# Patient Record
Sex: MALE | Race: WHITE | Employment: FULL TIME | URBAN - METROPOLITAN AREA
[De-identification: names, ages, dates, MRNs, and addresses within clinical notes are randomized per-mention and may not be internally consistent; named-entity substitution may affect disease eponyms.]

---

## 2017-01-23 RX ORDER — ALLOPURINOL 300 MG/1
300 TABLET ORAL DAILY
Qty: 30 TABLET | Refills: 0 | Status: SHIPPED | OUTPATIENT
Start: 2017-01-23 | End: 2017-03-15 | Stop reason: SDUPTHER

## 2017-03-15 ENCOUNTER — OFFICE VISIT (OUTPATIENT)
Dept: FAMILY MEDICINE CLINIC | Age: 47
End: 2017-03-15
Payer: COMMERCIAL

## 2017-03-15 VITALS
SYSTOLIC BLOOD PRESSURE: 114 MMHG | BODY MASS INDEX: 36.78 KG/M2 | HEART RATE: 60 BPM | WEIGHT: 295.8 LBS | DIASTOLIC BLOOD PRESSURE: 82 MMHG | HEIGHT: 75 IN

## 2017-03-15 DIAGNOSIS — M1A.09X0 CHRONIC GOUT OF MULTIPLE SITES, UNSPECIFIED CAUSE: Chronic | ICD-10-CM

## 2017-03-15 DIAGNOSIS — M79.645 FINGER PAIN, LEFT: Primary | ICD-10-CM

## 2017-03-15 DIAGNOSIS — M79.645 FINGER PAIN, LEFT: ICD-10-CM

## 2017-03-15 DIAGNOSIS — I48.0 PAROXYSMAL ATRIAL FIBRILLATION (HCC): Chronic | ICD-10-CM

## 2017-03-15 PROCEDURE — 90471 IMMUNIZATION ADMIN: CPT

## 2017-03-15 PROCEDURE — 90715 TDAP VACCINE 7 YRS/> IM: CPT

## 2017-03-15 PROCEDURE — 99213 OFFICE O/P EST LOW 20 MIN: CPT

## 2017-03-15 RX ORDER — PROPAFENONE HYDROCHLORIDE 225 MG/1
225 CAPSULE, EXTENDED RELEASE ORAL 2 TIMES DAILY
COMMUNITY
Start: 2017-01-19 | End: 2017-09-07 | Stop reason: SDUPTHER

## 2017-03-15 RX ORDER — ALLOPURINOL 300 MG/1
300 TABLET ORAL DAILY
Qty: 90 TABLET | Refills: 3 | Status: SHIPPED | OUTPATIENT
Start: 2017-03-15 | End: 2018-07-23 | Stop reason: SDUPTHER

## 2017-03-15 ASSESSMENT — ENCOUNTER SYMPTOMS
SHORTNESS OF BREATH: 0
COUGH: 0
GASTROINTESTINAL NEGATIVE: 1

## 2017-03-17 ENCOUNTER — HOSPITAL ENCOUNTER (OUTPATIENT)
Age: 47
Setting detail: SPECIMEN
Discharge: HOME | End: 2017-03-17
Payer: COMMERCIAL

## 2017-03-17 DIAGNOSIS — I48.0 PAROXYSMAL ATRIAL FIBRILLATION (HCC): Chronic | ICD-10-CM

## 2017-03-17 LAB
ALBUMIN SERPL-MCNC: 4.5 G/DL (ref 3.5–5.2)
ALBUMIN/GLOBULIN RATIO: 1.6 (ref 1–2.5)
ALP BLD-CCNC: 81 U/L (ref 40–129)
ALT SERPL-CCNC: 44 U/L (ref 5–41)
ANION GAP SERPL CALCULATED.3IONS-SCNC: 13 MMOL/L (ref 9–17)
AST SERPL-CCNC: 34 U/L
BILIRUB SERPL-MCNC: 2.1 MG/DL (ref 0.3–1.2)
BUN BLDV-MCNC: 25 MG/DL (ref 6–20)
BUN/CREAT BLD: ABNORMAL (ref 9–20)
CALCIUM SERPL-MCNC: 9.2 MG/DL (ref 8.6–10.4)
CHLORIDE BLD-SCNC: 102 MMOL/L (ref 98–107)
CHOLESTEROL/HDL RATIO: 5.3
CHOLESTEROL: 144 MG/DL
CO2: 26 MMOL/L (ref 20–31)
CREAT SERPL-MCNC: 1.06 MG/DL (ref 0.7–1.2)
GFR AFRICAN AMERICAN: >60 ML/MIN
GFR NON-AFRICAN AMERICAN: >60 ML/MIN
GFR SERPL CREATININE-BSD FRML MDRD: ABNORMAL ML/MIN/{1.73_M2}
GFR SERPL CREATININE-BSD FRML MDRD: ABNORMAL ML/MIN/{1.73_M2}
GLUCOSE BLD-MCNC: 89 MG/DL (ref 70–99)
HDLC SERPL-MCNC: 27 MG/DL
HIV AG/AB: NONREACTIVE
LDL CHOLESTEROL: 95 MG/DL (ref 0–130)
POTASSIUM SERPL-SCNC: 4.6 MMOL/L (ref 3.7–5.3)
SODIUM BLD-SCNC: 141 MMOL/L (ref 135–144)
TOTAL PROTEIN: 7.4 G/DL (ref 6.4–8.3)
TRIGL SERPL-MCNC: 109 MG/DL
URIC ACID: 8.6 MG/DL (ref 3.4–7)
VLDLC SERPL CALC-MCNC: ABNORMAL MG/DL (ref 1–30)

## 2017-09-07 ENCOUNTER — OFFICE VISIT (OUTPATIENT)
Dept: FAMILY MEDICINE CLINIC | Age: 47
End: 2017-09-07
Payer: COMMERCIAL

## 2017-09-07 ENCOUNTER — HOSPITAL ENCOUNTER (OUTPATIENT)
Age: 47
Setting detail: SPECIMEN
Discharge: HOME | End: 2017-09-07
Payer: COMMERCIAL

## 2017-09-07 VITALS
OXYGEN SATURATION: 98 % | SYSTOLIC BLOOD PRESSURE: 136 MMHG | HEART RATE: 68 BPM | BODY MASS INDEX: 32.55 KG/M2 | WEIGHT: 261.8 LBS | DIASTOLIC BLOOD PRESSURE: 84 MMHG | HEIGHT: 75 IN

## 2017-09-07 DIAGNOSIS — I48.0 PAROXYSMAL ATRIAL FIBRILLATION (HCC): ICD-10-CM

## 2017-09-07 DIAGNOSIS — M1A.09X0 CHRONIC GOUT OF MULTIPLE SITES, UNSPECIFIED CAUSE: ICD-10-CM

## 2017-09-07 DIAGNOSIS — I48.0 PAROXYSMAL ATRIAL FIBRILLATION (HCC): Primary | ICD-10-CM

## 2017-09-07 LAB
ANION GAP SERPL CALCULATED.3IONS-SCNC: 12 MMOL/L (ref 9–17)
BUN BLDV-MCNC: 16 MG/DL (ref 6–20)
BUN/CREAT BLD: NORMAL (ref 9–20)
CALCIUM SERPL-MCNC: 9.6 MG/DL (ref 8.6–10.4)
CHLORIDE BLD-SCNC: 103 MMOL/L (ref 98–107)
CO2: 25 MMOL/L (ref 20–31)
CREAT SERPL-MCNC: 0.94 MG/DL (ref 0.7–1.2)
GFR AFRICAN AMERICAN: >60 ML/MIN
GFR NON-AFRICAN AMERICAN: >60 ML/MIN
GFR SERPL CREATININE-BSD FRML MDRD: NORMAL ML/MIN/{1.73_M2}
GFR SERPL CREATININE-BSD FRML MDRD: NORMAL ML/MIN/{1.73_M2}
GLUCOSE BLD-MCNC: 94 MG/DL (ref 70–99)
POTASSIUM SERPL-SCNC: 5.1 MMOL/L (ref 3.7–5.3)
SODIUM BLD-SCNC: 140 MMOL/L (ref 135–144)
URIC ACID: 7 MG/DL (ref 3.4–7)

## 2017-09-07 PROCEDURE — 99214 OFFICE O/P EST MOD 30 MIN: CPT | Performed by: FAMILY MEDICINE

## 2017-09-07 RX ORDER — PROPAFENONE HYDROCHLORIDE 225 MG/1
225 CAPSULE, EXTENDED RELEASE ORAL 2 TIMES DAILY
Qty: 60 CAPSULE | Refills: 0 | Status: SHIPPED | OUTPATIENT
Start: 2017-09-07 | End: 2017-11-03 | Stop reason: ALTCHOICE

## 2017-09-07 ASSESSMENT — PATIENT HEALTH QUESTIONNAIRE - PHQ9
2. FEELING DOWN, DEPRESSED OR HOPELESS: 0
SUM OF ALL RESPONSES TO PHQ QUESTIONS 1-9: 0
1. LITTLE INTEREST OR PLEASURE IN DOING THINGS: 0
SUM OF ALL RESPONSES TO PHQ9 QUESTIONS 1 & 2: 0

## 2017-11-03 ENCOUNTER — OFFICE VISIT (OUTPATIENT)
Dept: FAMILY MEDICINE CLINIC | Age: 47
End: 2017-11-03
Payer: COMMERCIAL

## 2017-11-03 VITALS
HEART RATE: 80 BPM | SYSTOLIC BLOOD PRESSURE: 116 MMHG | OXYGEN SATURATION: 98 % | WEIGHT: 261 LBS | BODY MASS INDEX: 32.45 KG/M2 | DIASTOLIC BLOOD PRESSURE: 64 MMHG | HEIGHT: 75 IN

## 2017-11-03 DIAGNOSIS — M25.511 CHRONIC RIGHT SHOULDER PAIN: Primary | ICD-10-CM

## 2017-11-03 DIAGNOSIS — G89.29 CHRONIC RIGHT SHOULDER PAIN: Primary | ICD-10-CM

## 2017-11-03 PROCEDURE — 99213 OFFICE O/P EST LOW 20 MIN: CPT | Performed by: FAMILY MEDICINE

## 2017-11-03 NOTE — PROGRESS NOTES
Subjective:  Carmel Torrez presents for   Chief Complaint   Patient presents with    Shoulder Pain     right     For months. Is weight lifting and doing calisthenics. is off all the cardiac meds as of last months      Patient Active Problem List   Diagnosis    Chronic gout of multiple sites    Paroxysmal atrial fibrillation ([facility] [address]       Review of Systems:  · General: no significant weight changes. · Respiratory: no cough, pleuritic chest pain, dyspnea, or wheezing  · Cardiovascular: no pain, GONZALEZ, orthopnea, palpitations, or claudication  · Gastrointestinal: no chronic nausea, vomiting, heartburn, diarrhea, constipation, bloating, or abdominal pain. No bloody or black stools. Objective:  Physical Exam   Vitals:   Vitals:    11/03/17 0750   BP: 116/64   Pulse: 80   SpO2: 98%   Weight: 261 lb (118.4 kg)   Height: 6' 3\" (1.905 m)     Wt Readings from Last 3 Encounters:   11/03/17 261 lb (118.4 kg)   09/07/17 261 lb 12.8 oz (118.8 kg)   03/15/17 295 lb 12.8 oz (134.2 kg)     Ht Readings from Last 3 Encounters:   11/03/17 6' 3\" (1.905 m)   09/07/17 6' 3\" (1.905 m)   03/15/17 6' 3\" (1.905 m)     Body mass index is 32.62 kg/m². Constitutional: He is oriented to person, place, and time. He appears well-developed and well-nourished and in no acute distress. Answers all my questions appropriately. Head: Normocephalic and atraumatic. Eyes:conjunctiva appear normal.      From of the right shoulder. But complains of discomfort over the mid deltoid. Normal strenght. No discoloration. Assessment:   Encounter Diagnosis   Name Primary?  Chronic right shoulder pain Yes         Plan:   eliminate the weight training and calesthenic and any exercise inviling the ue. X 4 weeks. Call if not resolved.     Use heat and topicals creams and nsaids

## 2018-07-23 RX ORDER — ALLOPURINOL 300 MG/1
300 TABLET ORAL DAILY
Qty: 90 TABLET | Refills: 3 | Status: SHIPPED | OUTPATIENT
Start: 2018-07-23 | End: 2019-10-30 | Stop reason: SDUPTHER

## 2018-07-23 NOTE — TELEPHONE ENCOUNTER
Next Visit Date:  No future appointments.     Health Maintenance   Topic Date Due    Flu vaccine (1) 09/01/2018    Lipid screen  03/17/2022    DTaP/Tdap/Td vaccine (2 - Td) 03/15/2027    HIV screen  Completed       No results found for: LABA1C          ( goal A1C is < 7)   No results found for: LABMICR  LDL Cholesterol (mg/dL)   Date Value   03/17/2017 95   10/16/2015 89       (goal LDL is <100)   AST (U/L)   Date Value   03/17/2017 34     ALT (U/L)   Date Value   03/17/2017 44 (H)     BUN (mg/dL)   Date Value   09/07/2017 16     BP Readings from Last 3 Encounters:   11/03/17 116/64   09/07/17 136/84   03/15/17 114/82          (goal 120/80)    All Future Testing planned in [facility]              Patient Active Problem List:     Chronic gout of multiple sites     Paroxysmal atrial fibrillation ([facility] [address]

## 2018-11-27 ENCOUNTER — PATIENT MESSAGE (OUTPATIENT)
Dept: FAMILY MEDICINE CLINIC | Age: 48
End: 2018-11-27

## 2018-11-27 DIAGNOSIS — Z80.3 FH: BREAST CANCER IN FIRST DEGREE RELATIVE: Primary | ICD-10-CM

## 2018-12-10 ENCOUNTER — INITIAL CONSULT (OUTPATIENT)
Dept: ONCOLOGY | Age: 48
End: 2018-12-10
Payer: COMMERCIAL

## 2018-12-10 DIAGNOSIS — Z80.3 FAMILY HISTORY OF BREAST CANCER: Primary | ICD-10-CM

## 2018-12-10 DIAGNOSIS — Z84.81 FAMILY HISTORY OF BRCA2 GENE POSITIVE: ICD-10-CM

## 2018-12-10 PROCEDURE — 96040 PR GENETIC COUNSELING, EACH 30 MIN: CPT | Performed by: GENETIC COUNSELOR, MS

## 2018-12-10 NOTE — PROGRESS NOTES
3 [facility]   Hereditary Cancer Risk Assessment      Name: Jaime Chavez     YOB: 1970   Date of Consultation: 12/10/18      Mr. Joshua Zavala was seen at the [facility] for genetic counseling on 12/10/18. Mr. Joshua Zavala was referred by Dr. Becka Jauregui MD due to his family history of breast cancer due to a known BRCA2 gene mutation.     PERSONAL AND FAMILY HISTORY   Mr. Joshua Zavala is a 52 y.o.  male with no personal history of cancer. He has no major cancer risk factors aside from his occupation which is a . His family history is significant for his mother with breast cancer who recently underwent genetic testing and was found to carry a BRCA2 gene mutation. Mr. Joshua Zavala has several other maternal relatives with breast and ovarian cancer which the familial BRCA2 mutation has been linked to. Mr. Melyssa Parsons paternal family is significant for his father with prostate cancer diagnosed at age 59 and a paternal uncle with colon cancer at age 62. There are no other significant cancers on that side of the family. RISK ASSESSMENT   We discussed that approximately 5-10% of cancers are due to a hereditary gene mutation which causes an increased risk for certain cancers. His mother was offered genetic counseling and testing based on her personal and family history of breast cancer. His mother's test results revealed a mutation in the BRCA2 gene. The specific mutation is called [id number] ([id number]*). First degree relatives (parents, siblings, and children) of an individual with a BRCA2 mutation have a 1 in 2 (50%) chance of being a carrier for the same genetic mutation. Thus, Mr. Joshua Zavala has a 50% chance to be a carrier for the mutation.      In summary, Mr. Joshua Zavala meets the [address] [place] [address] (NCCN) guidelines for genetic testing to clarify whether he carries the familial BRCA2 mutation.      Since there are few

## 2019-01-03 ENCOUNTER — TELEPHONE (OUTPATIENT)
Dept: ONCOLOGY | Age: 49
End: 2019-01-03

## 2019-01-30 ENCOUNTER — HOSPITAL ENCOUNTER (OUTPATIENT)
Age: 49
Setting detail: SPECIMEN
Discharge: HOME | End: 2019-01-30
Payer: COMMERCIAL

## 2019-01-30 ENCOUNTER — OFFICE VISIT (OUTPATIENT)
Dept: FAMILY MEDICINE CLINIC | Age: 49
End: 2019-01-30
Payer: COMMERCIAL

## 2019-01-30 VITALS
HEART RATE: 69 BPM | WEIGHT: 254.38 LBS | OXYGEN SATURATION: 98 % | DIASTOLIC BLOOD PRESSURE: 70 MMHG | BODY MASS INDEX: 31.79 KG/M2 | SYSTOLIC BLOOD PRESSURE: 130 MMHG

## 2019-01-30 DIAGNOSIS — Z15.09 BRCA GENE MUTATION POSITIVE IN MALE: ICD-10-CM

## 2019-01-30 DIAGNOSIS — Z15.01 BRCA GENE MUTATION POSITIVE IN MALE: ICD-10-CM

## 2019-01-30 DIAGNOSIS — I48.0 PAROXYSMAL ATRIAL FIBRILLATION (HCC): ICD-10-CM

## 2019-01-30 DIAGNOSIS — Z15.03 BRCA GENE MUTATION POSITIVE IN MALE: ICD-10-CM

## 2019-01-30 DIAGNOSIS — Z12.5 SCREENING FOR PROSTATE CANCER: ICD-10-CM

## 2019-01-30 DIAGNOSIS — M1A.09X0 CHRONIC GOUT OF MULTIPLE SITES, UNSPECIFIED CAUSE: Primary | ICD-10-CM

## 2019-01-30 DIAGNOSIS — M1A.09X0 CHRONIC GOUT OF MULTIPLE SITES, UNSPECIFIED CAUSE: ICD-10-CM

## 2019-01-30 LAB
PROSTATE SPECIFIC ANTIGEN: 1.31 UG/L
URIC ACID: 7.6 MG/DL (ref 3.4–7)

## 2019-01-30 PROCEDURE — 99214 OFFICE O/P EST MOD 30 MIN: CPT | Performed by: FAMILY MEDICINE

## 2019-01-30 ASSESSMENT — PATIENT HEALTH QUESTIONNAIRE - PHQ9
SUM OF ALL RESPONSES TO PHQ9 QUESTIONS 1 & 2: 0
SUM OF ALL RESPONSES TO PHQ QUESTIONS 1-9: 0
2. FEELING DOWN, DEPRESSED OR HOPELESS: 0
SUM OF ALL RESPONSES TO PHQ QUESTIONS 1-9: 0
1. LITTLE INTEREST OR PLEASURE IN DOING THINGS: 0

## 2019-01-31 DIAGNOSIS — M1A.09X0 CHRONIC GOUT OF MULTIPLE SITES, UNSPECIFIED CAUSE: Primary | Chronic | ICD-10-CM

## 2019-03-29 ENCOUNTER — HOSPITAL ENCOUNTER (OUTPATIENT)
Age: 49
Setting detail: SPECIMEN
Discharge: HOME | End: 2019-03-29
Payer: COMMERCIAL

## 2019-03-29 LAB — URIC ACID: 5.9 MG/DL (ref 3.4–7)

## 2019-10-31 RX ORDER — ALLOPURINOL 300 MG/1
TABLET ORAL
Qty: 90 TABLET | Refills: 2 | Status: SHIPPED | OUTPATIENT
Start: 2019-10-31 | End: 2020-11-19

## 2019-12-05 ENCOUNTER — OFFICE VISIT (OUTPATIENT)
Dept: FAMILY MEDICINE CLINIC | Age: 49
End: 2019-12-05
Payer: COMMERCIAL

## 2019-12-05 VITALS
RESPIRATION RATE: 18 BRPM | OXYGEN SATURATION: 98 % | DIASTOLIC BLOOD PRESSURE: 90 MMHG | BODY MASS INDEX: 35.88 KG/M2 | HEIGHT: 75 IN | HEART RATE: 84 BPM | SYSTOLIC BLOOD PRESSURE: 124 MMHG | WEIGHT: 288.6 LBS

## 2019-12-05 DIAGNOSIS — J40 BRONCHITIS: Primary | ICD-10-CM

## 2019-12-05 PROCEDURE — 99213 OFFICE O/P EST LOW 20 MIN: CPT | Performed by: FAMILY MEDICINE

## 2019-12-05 RX ORDER — DOXYCYCLINE 100 MG/1
100 CAPSULE ORAL 2 TIMES DAILY WITH MEALS
Qty: 20 CAPSULE | Refills: 0 | Status: SHIPPED | OUTPATIENT
Start: 2019-12-05 | End: 2020-08-24

## 2020-04-24 ENCOUNTER — E-VISIT (OUTPATIENT)
Dept: FAMILY MEDICINE CLINIC | Age: 50
End: 2020-04-24
Payer: COMMERCIAL

## 2020-04-24 PROCEDURE — 99421 OL DIG E/M SVC 5-10 MIN: CPT | Performed by: FAMILY MEDICINE

## 2020-04-24 RX ORDER — CYCLOBENZAPRINE HCL 10 MG
10 TABLET ORAL 3 TIMES DAILY PRN
Qty: 30 TABLET | Refills: 0 | Status: SHIPPED | OUTPATIENT
Start: 2020-04-24 | End: 2020-05-04

## 2020-04-24 NOTE — PROGRESS NOTES
HPI: per patient's questionnaire    EXAM: not applicable    Diagnoses and all orders for this visit:    1. Acute exacerbation of chronic low back pain  Failing to change as expected. - cyclobenzaprine (FLEXERIL) 10 MG tablet; Take 1 tablet by mouth 3 times daily as needed for Muscle spasms Causes sedation, don't drive when taking it  Dispense: 30 tablet; Refill: 0  - camphor-menthol-methyl salicylate (BENGAY ULTRA STRENGTH) 4-10-30 % CREA cream; Apply topically 3 times daily as needed for Pain  Dispense: 113 g; Refill: 0  - XR LUMBAR SPINE (2-3 VIEWS); Future      Patient was advised to contact PCP if symptoms worsen or failing to change as expected    5-10 minutes were spent on the digital evaluation and management of this patient.

## 2020-04-27 ENCOUNTER — NURSE TRIAGE (OUTPATIENT)
Dept: OTHER | Facility: CLINIC | Age: 50
End: 2020-04-27

## 2020-04-27 NOTE — TELEPHONE ENCOUNTER
some weakness when pain shoots at times with positioning. States he was advised to call back in 2 days if not better so pt. Transferred to [facility] to schedule with another e visit or virtual visit today. Please do not respond to the triage nurse through this encounter. Any subsequent communication should be directly with the patient.

## 2020-04-28 ENCOUNTER — TELEMEDICINE (OUTPATIENT)
Dept: FAMILY MEDICINE CLINIC | Age: 50
End: 2020-04-28
Payer: COMMERCIAL

## 2020-04-28 ENCOUNTER — TELEPHONE (OUTPATIENT)
Dept: FAMILY MEDICINE CLINIC | Age: 50
End: 2020-04-28

## 2020-04-28 PROCEDURE — 99214 OFFICE O/P EST MOD 30 MIN: CPT | Performed by: FAMILY MEDICINE

## 2020-04-28 RX ORDER — PREDNISONE 20 MG/1
60 TABLET ORAL ONCE
Qty: 3 TABLET | Refills: 0 | Status: SHIPPED | OUTPATIENT
Start: 2020-04-28 | End: 2020-04-28

## 2020-04-28 RX ORDER — PREDNISONE 20 MG/1
20 TABLET ORAL 2 TIMES DAILY
Qty: 10 TABLET | Refills: 0 | Status: SHIPPED | OUTPATIENT
Start: 2020-04-29 | End: 2020-05-04

## 2020-04-28 RX ORDER — NAPROXEN 500 MG/1
500 TABLET ORAL 2 TIMES DAILY WITH MEALS
Qty: 60 TABLET | Refills: 3 | Status: SHIPPED | OUTPATIENT
Start: 2020-04-28 | End: 2020-08-24

## 2020-04-28 RX ORDER — CYCLOBENZAPRINE HCL 10 MG
10 TABLET ORAL 3 TIMES DAILY PRN
Qty: 30 TABLET | Refills: 0 | Status: SHIPPED | OUTPATIENT
Start: 2020-04-28 | End: 2020-05-08

## 2020-04-28 NOTE — PROGRESS NOTES
Progress Note    Madhav Silvestre is a 52 y.o.  male who presents today alone for evaluation of   Chief Complaint   Patient presents with    Lower Back Pain           HPI:   Patient is here for low back pain. Patient states this has been ongoing for the last year. Patient states he has had a dull ache over the lumbar/sacral area. Patient states it can radiate into either lower extremity. Patient denies trauma or injury to the low back. Patient did go to  and was given muscle relaxers and steroids. Patient denies saddle anesthesia, urinary retention, or stool incontinence. He denies radicular symptoms. He has not had any recent imaging of his low back. He has not completed PT for his low back. He has never seen a specialist for low back pain. Patient does go to the chiropractor which does provide him with some relief. There are no preventive care reminders to display for this patient.      Current Medications:     Current Outpatient Medications   Medication Sig Dispense Refill    [START ON 4/29/2020] predniSONE (DELTASONE) 20 MG tablet Take 1 tablet by mouth 2 times daily for 5 days Start tomorrow 10 tablet 0    predniSONE (DELTASONE) 20 MG tablet Take 3 tablets by mouth once for 1 dose Start today 3 tablet 0    cyclobenzaprine (FLEXERIL) 10 MG tablet Take 1 tablet by mouth 3 times daily as needed for Muscle spasms 30 tablet 0    naproxen (NAPROSYN) 500 MG tablet Take 1 tablet by mouth 2 times daily (with meals) 60 tablet 3    cyclobenzaprine (FLEXERIL) 10 MG tablet Take 1 tablet by mouth 3 times daily as needed for Muscle spasms Causes sedation, don't drive when taking it 30 tablet 0    camphor-menthol-methyl salicylate (BENGAY ULTRA STRENGTH) 4-10-30 % CREA cream Apply topically 3 times daily as needed for Pain 113 g 0    doxycycline monohydrate (MONODOX) 100 MG capsule Take 1 capsule by mouth 2 times daily (with meals) Avoid calcium, mtv's and dairy 2 hours before and after 20 capsule 0   

## 2020-08-24 ENCOUNTER — OFFICE VISIT (OUTPATIENT)
Dept: FAMILY MEDICINE CLINIC | Age: 50
End: 2020-08-24
Payer: COMMERCIAL

## 2020-08-24 VITALS
HEART RATE: 87 BPM | WEIGHT: 272 LBS | BODY MASS INDEX: 34 KG/M2 | SYSTOLIC BLOOD PRESSURE: 120 MMHG | DIASTOLIC BLOOD PRESSURE: 80 MMHG | TEMPERATURE: 98.2 F | OXYGEN SATURATION: 98 % | RESPIRATION RATE: 18 BRPM

## 2020-08-24 PROCEDURE — 99214 OFFICE O/P EST MOD 30 MIN: CPT | Performed by: FAMILY MEDICINE

## 2020-08-24 RX ORDER — OMEPRAZOLE 20 MG/1
20 CAPSULE, DELAYED RELEASE ORAL DAILY
Qty: 30 CAPSULE | Refills: 11 | Status: SHIPPED | OUTPATIENT
Start: 2020-08-24

## 2020-08-24 ASSESSMENT — PATIENT HEALTH QUESTIONNAIRE - PHQ9
SUM OF ALL RESPONSES TO PHQ QUESTIONS 1-9: 0
SUM OF ALL RESPONSES TO PHQ QUESTIONS 1-9: 0
2. FEELING DOWN, DEPRESSED OR HOPELESS: 0
SUM OF ALL RESPONSES TO PHQ9 QUESTIONS 1 & 2: 0
1. LITTLE INTEREST OR PLEASURE IN DOING THINGS: 0

## 2020-08-24 NOTE — PROGRESS NOTES
Subjective:  Tiffanie Nazario presents for   Chief Complaint   Patient presents with    Abdominal Pain    Shortness of Breath    Anorexia   got worse thei weekend . Started last week    Eating food can make it worse , especially volume of food. Antiacids help a littl    Belching helps. Sleeping really is not alltered. Feels bloated. No diarrhrea. No change in meds    Takes naproxedn daily    etoh - rare    everyonesce in a while the bloating makes it hard to take a deep breath (this is what he describes asw SOB)    In the past he wuold get terrible gerd if eating late at night      Patient Active Problem List   Diagnosis    Chronic gout of multiple sites    Paroxysmal atrial fibrillation ([facility] [address]    BRCA gene mutation positive in male         Review of Systems:  · General: no significant weight changes. · Respiratory: no cough, pleuritic chest pain, dyspnea, or wheezing  · Cardiovascular: no pain, GONZALEZ, orthopnea, palpitations or claudication  · Gastrointestinal: no chronic nausea, vomiting, heartburn, diarrhea, constipation, bloating, or abdominal pain. No bloody or black stools. Objective:  Physical Exam   Vitals:   Vitals:    08/24/20 0951   BP: 120/80   Pulse: 87   Resp: 18   Temp: 98.2 °F (36.8 °C)   TempSrc: Temporal   SpO2: 98%   Weight: 272 lb (123.4 kg)     Wt Readings from Last 3 Encounters:   08/24/20 272 lb (123.4 kg)   12/05/19 288 lb 9.6 oz (130.9 kg)   01/30/19 254 lb 6 oz (115.4 kg)     Ht Readings from Last 3 Encounters:   12/05/19 6' 3\" (1.905 m)   11/03/17 6' 3\" (1.905 m)   09/07/17 6' 3\" (1.905 m)     Body mass index is 34 kg/m². Constitutional: He is oriented to person, place, and time. He appears well-developed and well-nourished and in no acute distress. Answers all my questions appropriately. Head: Normocephalic and atraumatic. Eyes:conjunctiva appear normal.  Heart: RRR without murmur. No S3, S4, or gallop noted. Chest: Clear to auscultation bilaterally.   Good breath diet and eating habits, such as not eating late at night, losing weight, and making other lifestyle changes can also help reduce symptoms. Follow-up care is a key part of your treatment and safety. Be sure to make and go to all appointments, and call your doctor if you are having problems. It's also a good idea to know your test results and keep a list of the medicines you take. How can you care for yourself at home? · Take your medicines exactly as prescribed. Call your doctor if you think you are having a problem with your medicine. · Your doctor may recommend over-the-counter medicine. For mild or occasional indigestion, antacids, such as Tums, Gaviscon, Mylanta, or Maalox, may help. Your doctor also may recommend over-the-counter acid reducers, such as Pepcid AC (famotidine), Tagamet HB (cimetidine), or Prilosec (omeprazole). Read and follow all instructions on the label. If you use these medicines often, talk with your doctor. · Change your eating habits. ? It's best to eat several small meals instead of two or three large meals. ? After you eat, wait 2 to 3 hours before you lie down. ? Chocolate, mint, and alcohol can make GERD worse. ? Spicy foods, foods that have a lot of acid (like tomatoes and oranges), and coffee can make GERD symptoms worse in some people. If your symptoms are worse after you eat a certain food, you may want to stop eating that food to see if your symptoms get better. · Do not smoke or chew tobacco. Smoking can make GERD worse. If you need help quitting, talk to your doctor about stop-smoking programs and medicines. These can increase your chances of quitting for good. · If you have GERD symptoms at night, raise the head of your bed 6 to 8 inches by putting the frame on blocks or placing a foam wedge under the head of your mattress. (Adding extra pillows does not work.)  · Do not wear tight clothing around your middle. · Lose weight if you need to.  Losing just 5 to 10 pounds can help. When should you call for help? Call your doctor now or seek immediate medical care if:  · You have new or different belly pain. · Your stools are black and tarlike or have streaks of blood. Watch closely for changes in your health, and be sure to contact your doctor if:  · Your symptoms have not improved after 2 days. · Food seems to catch in your throat or chest.  Where can you learn more? Go to https://Planet PaymentpeOdyssey Mobile Interactioneb.UserApp. org and sign in to your [employer] account. Enter F739 in the [employer] box to learn more about \"Gastroesophageal Reflux Disease (GERD): Care Instructions. \"     If you do not have an account, please click on the \"Sign Up Now\" link. Current as of: August 12, 2019               Content Version: 12.5  © 0537-6035 Healthwise, Incorporated. Care instructions adapted under license by [facility] ([facility]). If you have questions about a medical condition or this instruction, always ask your healthcare professional. [address] any warranty or liability for your use of this information. Data Unavailable      Discussed harry, covid.     He is to contact me in 2 weeks about the GERD tx

## 2020-08-24 NOTE — PATIENT INSTRUCTIONS
Patient Education        Gastroesophageal Reflux Disease (GERD): Care Instructions  Your Care Instructions     Gastroesophageal reflux disease (GERD) is the backward flow of stomach acid into the esophagus. The esophagus is the tube that leads from your throat to your stomach. A one-way valve prevents the stomach acid from backing up into this tube. When you have GERD, this valve does not close tightly enough. This can also cause pain and swelling in your esophagus (esophagitis). If you have mild GERD symptoms including heartburn, you may be able to control the problem with antacids or over-the-counter medicine. Changing your diet and eating habits, such as not eating late at night, losing weight, and making other lifestyle changes can also help reduce symptoms. Follow-up care is a key part of your treatment and safety. Be sure to make and go to all appointments, and call your doctor if you are having problems. It's also a good idea to know your test results and keep a list of the medicines you take. How can you care for yourself at home? · Take your medicines exactly as prescribed. Call your doctor if you think you are having a problem with your medicine. · Your doctor may recommend over-the-counter medicine. For mild or occasional indigestion, antacids, such as Tums, Gaviscon, Mylanta, or Maalox, may help. Your doctor also may recommend over-the-counter acid reducers, such as Pepcid AC (famotidine), Tagamet HB (cimetidine), or Prilosec (omeprazole). Read and follow all instructions on the label. If you use these medicines often, talk with your doctor. · Change your eating habits. ? It's best to eat several small meals instead of two or three large meals. ? After you eat, wait 2 to 3 hours before you lie down. ? Chocolate, mint, and alcohol can make GERD worse. ? Spicy foods, foods that have a lot of acid (like tomatoes and oranges), and coffee can make GERD symptoms worse in some people.  If your symptoms are worse after you eat a certain food, you may want to stop eating that food to see if your symptoms get better. · Do not smoke or chew tobacco. Smoking can make GERD worse. If you need help quitting, talk to your doctor about stop-smoking programs and medicines. These can increase your chances of quitting for good. · If you have GERD symptoms at night, raise the head of your bed 6 to 8 inches by putting the frame on blocks or placing a foam wedge under the head of your mattress. (Adding extra pillows does not work.)  · Do not wear tight clothing around your middle. · Lose weight if you need to. Losing just 5 to 10 pounds can help. When should you call for help? Call your doctor now or seek immediate medical care if:  · You have new or different belly pain. · Your stools are black and tarlike or have streaks of blood. Watch closely for changes in your health, and be sure to contact your doctor if:  · Your symptoms have not improved after 2 days. · Food seems to catch in your throat or chest.  Where can you learn more? Go to https://Where.eTask.it. org and sign in to your [employer] account. Enter Z092 in the [facility] box to learn more about \"Gastroesophageal Reflux Disease (GERD): Care Instructions. \"     If you do not have an account, please click on the \"Sign Up Now\" link. Current as of: August 12, 2019               Content Version: 12.5  © 0024-8498 Healthwise, Incorporated. Care instructions adapted under license by [facility] ([facility]). If you have questions about a medical condition or this instruction, always ask your healthcare professional. [address] any warranty or liability for your use of this information.

## 2020-10-25 ENCOUNTER — HOSPITAL ENCOUNTER (OUTPATIENT)
Age: 50
Discharge: HOME | End: 2020-10-25
Payer: COMMERCIAL

## 2020-10-25 PROCEDURE — U0003 INFECTIOUS AGENT DETECTION BY NUCLEIC ACID (DNA OR RNA); SEVERE ACUTE RESPIRATORY SYNDROME CORONAVIRUS 2 (SARS-COV-2) (CORONAVIRUS DISEASE [COVID-19]), AMPLIFIED PROBE TECHNIQUE, MAKING USE OF HIGH THROUGHPUT TECHNOLOGIES AS DESCRIBED BY CMS-2020-01-R: HCPCS

## 2020-10-26 LAB
SARS-COV-2, RAPID: ABNORMAL
SARS-COV-2: ABNORMAL
SARS-COV-2: DETECTED
SOURCE: ABNORMAL

## 2020-10-28 ENCOUNTER — TELEPHONE (OUTPATIENT)
Dept: OPHTHALMOLOGY | Age: 50
End: 2020-10-28

## 2020-11-19 RX ORDER — ALLOPURINOL 300 MG/1
TABLET ORAL
Qty: 90 TABLET | Refills: 1 | Status: SHIPPED | OUTPATIENT
Start: 2020-11-19 | End: 2021-06-28

## 2020-11-19 NOTE — TELEPHONE ENCOUNTER
Kirill Alexander is calling to request a refill on the following medication(s):    Medication Request:  Requested Prescriptions     Pending Prescriptions Disp Refills    allopurinol (ZYLOPRIM) 300 MG tablet [Pharmacy Med Name: ALLOPURINOL 300 MG TABLET] 90 tablet 1     Sig: TAKE ONE TABLET BY MOUTH DAILY       Last Visit Date (If Applicable):  2/07/2110    Next Visit Date:    Visit date not found

## 2020-12-22 ENCOUNTER — HOSPITAL ENCOUNTER (OUTPATIENT)
Age: 50
Setting detail: SPECIMEN
Discharge: HOME | End: 2020-12-22
Payer: COMMERCIAL

## 2020-12-22 ENCOUNTER — OFFICE VISIT (OUTPATIENT)
Dept: FAMILY MEDICINE CLINIC | Age: 50
End: 2020-12-22
Payer: COMMERCIAL

## 2020-12-22 VITALS
OXYGEN SATURATION: 97 % | HEART RATE: 84 BPM | WEIGHT: 272.6 LBS | DIASTOLIC BLOOD PRESSURE: 82 MMHG | BODY MASS INDEX: 34.07 KG/M2 | SYSTOLIC BLOOD PRESSURE: 123 MMHG | TEMPERATURE: 97.9 F

## 2020-12-22 LAB
ABSOLUTE EOS #: 0.17 K/UL (ref 0–0.44)
ABSOLUTE IMMATURE GRANULOCYTE: <0.03 K/UL (ref 0–0.3)
ABSOLUTE LYMPH #: 1.48 K/UL (ref 1.1–3.7)
ABSOLUTE MONO #: 0.34 K/UL (ref 0.1–1.2)
ALBUMIN SERPL-MCNC: 4.6 G/DL (ref 3.5–5.2)
ALBUMIN/GLOBULIN RATIO: 1.6 (ref 1–2.5)
ALP BLD-CCNC: 60 U/L (ref 40–129)
ALT SERPL-CCNC: 29 U/L (ref 5–41)
ANION GAP SERPL CALCULATED.3IONS-SCNC: 17 MMOL/L (ref 9–17)
AST SERPL-CCNC: 28 U/L
BASOPHILS # BLD: 1 % (ref 0–2)
BASOPHILS ABSOLUTE: 0.04 K/UL (ref 0–0.2)
BILIRUB SERPL-MCNC: 1.9 MG/DL (ref 0.3–1.2)
BUN BLDV-MCNC: 15 MG/DL (ref 6–20)
BUN/CREAT BLD: ABNORMAL (ref 9–20)
CALCIUM SERPL-MCNC: 9.7 MG/DL (ref 8.6–10.4)
CHLORIDE BLD-SCNC: 105 MMOL/L (ref 98–107)
CHOLESTEROL/HDL RATIO: 4.2
CHOLESTEROL: 160 MG/DL
CO2: 19 MMOL/L (ref 20–31)
CREAT SERPL-MCNC: 0.99 MG/DL (ref 0.7–1.2)
DIFFERENTIAL TYPE: ABNORMAL
EOSINOPHILS RELATIVE PERCENT: 3 % (ref 1–4)
GFR AFRICAN AMERICAN: >60 ML/MIN
GFR NON-AFRICAN AMERICAN: >60 ML/MIN
GFR SERPL CREATININE-BSD FRML MDRD: ABNORMAL ML/MIN/{1.73_M2}
GFR SERPL CREATININE-BSD FRML MDRD: ABNORMAL ML/MIN/{1.73_M2}
GLUCOSE BLD-MCNC: 83 MG/DL (ref 70–99)
HCT VFR BLD CALC: 47.1 % (ref 40.7–50.3)
HDLC SERPL-MCNC: 38 MG/DL
HEMOGLOBIN: 15.9 G/DL (ref 13–17)
IMMATURE GRANULOCYTES: 0 %
LDL CHOLESTEROL: 97 MG/DL (ref 0–130)
LYMPHOCYTES # BLD: 24 % (ref 24–43)
MCH RBC QN AUTO: 27.7 PG (ref 25.2–33.5)
MCHC RBC AUTO-ENTMCNC: 33.8 G/DL (ref 28.4–34.8)
MCV RBC AUTO: 81.9 FL (ref 82.6–102.9)
MONOCYTES # BLD: 5 % (ref 3–12)
NRBC AUTOMATED: 0 PER 100 WBC
PDW BLD-RTO: 13.8 % (ref 11.8–14.4)
PLATELET # BLD: 170 K/UL (ref 138–453)
PLATELET ESTIMATE: ABNORMAL
PMV BLD AUTO: 10.8 FL (ref 8.1–13.5)
POTASSIUM SERPL-SCNC: 4.3 MMOL/L (ref 3.7–5.3)
PROSTATE SPECIFIC ANTIGEN: 1.48 UG/L
RBC # BLD: 5.75 M/UL (ref 4.21–5.77)
RBC # BLD: ABNORMAL 10*6/UL
SEG NEUTROPHILS: 67 % (ref 36–65)
SEGMENTED NEUTROPHILS ABSOLUTE COUNT: 4.21 K/UL (ref 1.5–8.1)
SODIUM BLD-SCNC: 141 MMOL/L (ref 135–144)
TOTAL PROTEIN: 7.5 G/DL (ref 6.4–8.3)
TRIGL SERPL-MCNC: 125 MG/DL
TSH SERPL DL<=0.05 MIU/L-ACNC: 0.86 MIU/L (ref 0.3–5)
URIC ACID: 6.3 MG/DL (ref 3.4–7)
VLDLC SERPL CALC-MCNC: ABNORMAL MG/DL (ref 1–30)
WBC # BLD: 6.3 K/UL (ref 3.5–11.3)
WBC # BLD: ABNORMAL 10*3/UL

## 2020-12-22 PROCEDURE — 90686 IIV4 VACC NO PRSV 0.5 ML IM: CPT | Performed by: FAMILY MEDICINE

## 2020-12-22 PROCEDURE — 90471 IMMUNIZATION ADMIN: CPT | Performed by: FAMILY MEDICINE

## 2020-12-22 PROCEDURE — 99214 OFFICE O/P EST MOD 30 MIN: CPT | Performed by: FAMILY MEDICINE

## 2020-12-22 RX ORDER — SENNOSIDES 8.6 MG
650 CAPSULE ORAL EVERY 8 HOURS PRN
COMMUNITY

## 2020-12-22 SDOH — ECONOMIC STABILITY: FOOD INSECURITY: WITHIN THE PAST 12 MONTHS, YOU WORRIED THAT YOUR FOOD WOULD RUN OUT BEFORE YOU GOT MONEY TO BUY MORE.: NEVER TRUE

## 2020-12-22 SDOH — ECONOMIC STABILITY: FOOD INSECURITY: WITHIN THE PAST 12 MONTHS, THE FOOD YOU BOUGHT JUST DIDN'T LAST AND YOU DIDN'T HAVE MONEY TO GET MORE.: NEVER TRUE

## 2020-12-22 SDOH — ECONOMIC STABILITY: INCOME INSECURITY: HOW HARD IS IT FOR YOU TO PAY FOR THE VERY BASICS LIKE FOOD, HOUSING, MEDICAL CARE, AND HEATING?: NOT HARD AT ALL

## 2020-12-22 SDOH — ECONOMIC STABILITY: TRANSPORTATION INSECURITY
IN THE PAST 12 MONTHS, HAS LACK OF TRANSPORTATION KEPT YOU FROM MEETINGS, WORK, OR FROM GETTING THINGS NEEDED FOR DAILY LIVING?: NOT ASKED

## 2020-12-22 SDOH — ECONOMIC STABILITY: TRANSPORTATION INSECURITY
IN THE PAST 12 MONTHS, HAS THE LACK OF TRANSPORTATION KEPT YOU FROM MEDICAL APPOINTMENTS OR FROM GETTING MEDICATIONS?: NOT ASKED

## 2020-12-22 ASSESSMENT — PATIENT HEALTH QUESTIONNAIRE - PHQ9
SUM OF ALL RESPONSES TO PHQ QUESTIONS 1-9: 1
SUM OF ALL RESPONSES TO PHQ9 QUESTIONS 1 & 2: 1
SUM OF ALL RESPONSES TO PHQ QUESTIONS 1-9: 1
2. FEELING DOWN, DEPRESSED OR HOPELESS: 1
SUM OF ALL RESPONSES TO PHQ QUESTIONS 1-9: 1
1. LITTLE INTEREST OR PLEASURE IN DOING THINGS: 0

## 2020-12-22 NOTE — PROGRESS NOTES
Subjective:  Zo Rodriguez presents for   Chief Complaint   Patient presents with    Annual Exam     Feels fien. Sometimes feels some palpitations    Is totally function    No gerd with the med he is approcaching the 8 weeks kandy      Patient Active Problem List   Diagnosis    Chronic gout of multiple sites    [facility]ox[facility]mal atrial fibrillation ([facility] [address]    BRCA gene mutation positive in male         Review of Systems:  · General: no significant weight changes. · Respiratory: no cough, pleuritic chest pain, dyspnea, or wheezing  · Cardiovascular: no pain, GONZALEZ, orthopnea, palpitations or claudication  · Gastrointestinal: no chronic nausea, vomiting, heartburn, diarrhea, constipation, bloating, or abdominal pain. No bloody or black stools. Objective:  Physical Exam   Vitals:   Vitals:    12/22/20 0955   BP: 123/82   Pulse: 84   Temp: 97.9 °F (36.6 °C)   TempSrc: Temporal   SpO2: 97%   Weight: 272 lb 9.6 oz (123.7 kg)     Wt Readings from Last 3 Encounters:   12/22/20 272 lb 9.6 oz (123.7 kg)   08/24/20 272 lb (123.4 kg)   12/05/19 288 lb 9.6 oz (130.9 kg)     Ht Readings from Last 3 Encounters:   12/05/19 6' 3\" (1.905 m)   11/03/17 6' 3\" (1.905 m)   09/07/17 6' 3\" (1.905 m)     Body mass index is 34.07 kg/m². Constitutional: He is oriented to person, place, and time. He appears well-developed and well-nourished and in no acute distress. Answers all my questions appropriately. Head: Normocephalic and atraumatic. Eyes:conjunctiva appear normal.    Nose: pink, non-edematous mucosa. No polyps. No septal deviation  Throat: no erythema, tonsillar hypertrophy or exudate. No ulcerations noted. Lips/Teeth/Gums all appear normal.  Neck: Normal range of motion. Neck supple. No tracheal deviation present. No abnormal lymphadenopathy. No JVD noted. Carotids are clear bilaterally. No thyroid masses noted. Heart: RRR without murmur. No S3, S4, or gallop noted. Chest: Clear to auscultation bilaterally. Good breath sounds noted. No rales, wheezes, or rhonchi noted. No respiratory retractions noted. Wall has symmetrical movement with respirations. Assessment:   Encounter Diagnoses   Name Primary?  Immunization due     Paroxysmal atrial fibrillation (HCC) Yes    Gastroesophageal reflux disease without esophagitis     Hyperuricemia     Prostate cancer screening     BRCA gene mutation positive in male          Plan:   There are no discontinued medications. THE ABOVE NOTED DISCONTINUED MEDS MAY ONLY BE FROM 'CLEANING UP' THE MED LIST AND WERE NOT ACTUALLY CANCELED;  SEE CHART FOR DETAILS! No orders of the defined types were placed in this encounter. Orders Placed This Encounter   Procedures    INFLUENZA, QUADV, 3 YRS AND OLDER, IM PF, PREFILL SYR OR SDV, 0.5ML (AFLURIA QUADV, PF)    CBC Auto Differential     Standing Status:   Future     Standing Expiration Date:   12/22/2021    Comprehensive Metabolic Panel     Standing Status:   Future     Standing Expiration Date:   12/22/2021    Lipid Panel     Standing Status:   Future     Standing Expiration Date:   12/22/2021     Order Specific Question:   Is Patient Fasting?/# of Hours     Answer:   8    TSH with Reflex     Standing Status:   Future     Standing Expiration Date:   12/22/2021    Psa screening     Standing Status:   Future     Standing Expiration Date:   12/22/2021    Uric Acid     Standing Status:   Future     Standing Expiration Date:   12/22/2021     Return in about 6 months (around 6/22/2021). Patient Instructions               American Cancer Society  2018 Guidelines            The ACS 'qualified recommendation' is based on statistical models (computers) not actual clinic trial with real humans.       Data Unavailable      He will call in 1 week about which kourtney vinson he wants to do    Has an appt with juliet today    recommmend he get the covid vaccine

## 2020-12-22 NOTE — PROGRESS NOTES
Vaccine Information Sheet, \"Influenza - Inactivated\"  given to Toño Aguirre, or parent/legal guardian of  Toño Aguirre and verbalized understanding. Patient responses:    Have you ever had a reaction to a flu vaccine? No  Do you have any current illness? No  Have you ever had Guillian Loco Syndrome? No  Do you have a serious allergy to any of the following: Neomycin, Polymyxin, Thimerosal, eggs or egg products? No    Flu vaccine given per order. Please see immunization tab. Risks and benefits explained. Current VIS given.       Immunizations Administered     Name Date Dose Route    Influenza, Quadv, IM, PF (6 mo and older Fluzone, Flulaval, Fluarix, and 3 yrs and older Afluria) 12/22/2020 0.5 mL Intramuscular    Site: Deltoid- Left    Lot: [id number]    NDC: 52632-406-43

## 2021-01-14 ENCOUNTER — HOSPITAL ENCOUNTER (EMERGENCY)
Facility: CLINIC | Age: 51
Discharge: HOME | End: 2021-01-14
Attending: EMERGENCY MEDICINE
Payer: COMMERCIAL

## 2021-01-14 ENCOUNTER — APPOINTMENT (OUTPATIENT)
Dept: GENERAL RADIOLOGY | Facility: CLINIC | Age: 51
End: 2021-01-14
Payer: COMMERCIAL

## 2021-01-14 VITALS
TEMPERATURE: 98.5 F | BODY MASS INDEX: 33.57 KG/M2 | HEART RATE: 80 BPM | RESPIRATION RATE: 16 BRPM | DIASTOLIC BLOOD PRESSURE: 95 MMHG | OXYGEN SATURATION: 98 % | WEIGHT: 270 LBS | SYSTOLIC BLOOD PRESSURE: 133 MMHG | HEIGHT: 75 IN

## 2021-01-14 DIAGNOSIS — R07.89 CHEST WALL PAIN: Primary | ICD-10-CM

## 2021-01-14 LAB
ABSOLUTE EOS #: 0.1 K/UL (ref 0–0.4)
ABSOLUTE IMMATURE GRANULOCYTE: NORMAL K/UL (ref 0–0.3)
ABSOLUTE LYMPH #: 1 K/UL (ref 1–4.8)
ABSOLUTE MONO #: 0.2 K/UL (ref 0.1–1.2)
ALBUMIN SERPL-MCNC: 4.5 G/DL (ref 3.5–5.2)
ALBUMIN/GLOBULIN RATIO: 1.5 (ref 1–2.5)
ALP BLD-CCNC: 63 U/L (ref 40–129)
ALT SERPL-CCNC: 28 U/L (ref 5–41)
ANION GAP SERPL CALCULATED.3IONS-SCNC: 14 MMOL/L (ref 9–17)
AST SERPL-CCNC: 21 U/L
BASOPHILS # BLD: 1 % (ref 0–2)
BASOPHILS ABSOLUTE: 0 K/UL (ref 0–0.2)
BILIRUB SERPL-MCNC: 1.2 MG/DL (ref 0.3–1.2)
BUN BLDV-MCNC: 26 MG/DL (ref 6–20)
BUN/CREAT BLD: ABNORMAL (ref 9–20)
CALCIUM SERPL-MCNC: 9.5 MG/DL (ref 8.6–10.4)
CHLORIDE BLD-SCNC: 101 MMOL/L (ref 98–107)
CO2: 24 MMOL/L (ref 20–31)
CREAT SERPL-MCNC: 1 MG/DL (ref 0.7–1.2)
D-DIMER QUANTITATIVE: <0.19 MG/L FEU
DIFFERENTIAL TYPE: NORMAL
EOSINOPHILS RELATIVE PERCENT: 3 % (ref 1–4)
GFR AFRICAN AMERICAN: >60 ML/MIN
GFR NON-AFRICAN AMERICAN: >60 ML/MIN
GFR SERPL CREATININE-BSD FRML MDRD: ABNORMAL ML/MIN/{1.73_M2}
GFR SERPL CREATININE-BSD FRML MDRD: ABNORMAL ML/MIN/{1.73_M2}
GLUCOSE BLD-MCNC: 160 MG/DL (ref 70–99)
HCT VFR BLD CALC: 44.3 % (ref 41–53)
HEMOGLOBIN: 15.1 G/DL (ref 13.5–17.5)
IMMATURE GRANULOCYTES: NORMAL %
LIPASE: 41 U/L (ref 13–60)
LYMPHOCYTES # BLD: 26 % (ref 24–44)
MCH RBC QN AUTO: 27.6 PG (ref 26–34)
MCHC RBC AUTO-ENTMCNC: 34.1 G/DL (ref 31–37)
MCV RBC AUTO: 80.9 FL (ref 80–100)
MONOCYTES # BLD: 5 % (ref 2–11)
NRBC AUTOMATED: NORMAL PER 100 WBC
PDW BLD-RTO: 15.4 % (ref 12.5–15.4)
PLATELET # BLD: 140 K/UL (ref 140–450)
PLATELET ESTIMATE: NORMAL
PMV BLD AUTO: 7.8 FL (ref 6–12)
POTASSIUM SERPL-SCNC: 3.9 MMOL/L (ref 3.7–5.3)
RBC # BLD: 5.48 M/UL (ref 4.5–5.9)
RBC # BLD: NORMAL 10*6/UL
SEG NEUTROPHILS: 65 % (ref 36–66)
SEGMENTED NEUTROPHILS ABSOLUTE COUNT: 2.6 K/UL (ref 1.8–7.7)
SODIUM BLD-SCNC: 139 MMOL/L (ref 135–144)
TOTAL PROTEIN: 7.5 G/DL (ref 6.4–8.3)
TROPONIN INTERP: NORMAL
TROPONIN INTERP: NORMAL
TROPONIN T: NORMAL NG/ML
TROPONIN T: NORMAL NG/ML
TROPONIN, HIGH SENSITIVITY: <6 NG/L (ref 0–22)
TROPONIN, HIGH SENSITIVITY: <6 NG/L (ref 0–22)
WBC # BLD: 4 K/UL (ref 3.5–11)
WBC # BLD: NORMAL 10*3/UL

## 2021-01-14 PROCEDURE — 6370000000 HC RX 637 (ALT 250 FOR IP): Performed by: EMERGENCY MEDICINE

## 2021-01-14 PROCEDURE — 36415 COLL VENOUS BLD VENIPUNCTURE: CPT

## 2021-01-14 PROCEDURE — 71045 X-RAY EXAM CHEST 1 VIEW: CPT

## 2021-01-14 PROCEDURE — 85025 COMPLETE CBC W/AUTO DIFF WBC: CPT

## 2021-01-14 PROCEDURE — 83690 ASSAY OF LIPASE: CPT

## 2021-01-14 PROCEDURE — 84484 ASSAY OF TROPONIN QUANT: CPT

## 2021-01-14 PROCEDURE — 93005 ELECTROCARDIOGRAM TRACING: CPT | Performed by: EMERGENCY MEDICINE

## 2021-01-14 PROCEDURE — 85379 FIBRIN DEGRADATION QUANT: CPT

## 2021-01-14 PROCEDURE — 99283 EMERGENCY DEPT VISIT LOW MDM: CPT

## 2021-01-14 PROCEDURE — 80053 COMPREHEN METABOLIC PANEL: CPT

## 2021-01-14 RX ORDER — NAPROXEN 500 MG/1
500 TABLET ORAL 2 TIMES DAILY PRN
Qty: 20 TABLET | Refills: 0 | Status: SHIPPED | OUTPATIENT
Start: 2021-01-14 | End: 2022-08-22 | Stop reason: ALTCHOICE

## 2021-01-14 RX ORDER — ASPIRIN 81 MG/1
81 TABLET ORAL DAILY
COMMUNITY

## 2021-01-14 RX ORDER — ASPIRIN 81 MG/1
324 TABLET, CHEWABLE ORAL ONCE
Status: COMPLETED | OUTPATIENT
Start: 2021-01-14 | End: 2021-01-14

## 2021-01-14 RX ADMIN — ASPIRIN 324 MG: 81 TABLET, CHEWABLE ORAL at 13:56

## 2021-01-14 ASSESSMENT — PAIN DESCRIPTION - ORIENTATION: ORIENTATION: LEFT

## 2021-01-14 ASSESSMENT — PAIN DESCRIPTION - DESCRIPTORS: DESCRIPTORS: ACHING

## 2021-01-14 ASSESSMENT — PAIN DESCRIPTION - LOCATION: LOCATION: CHEST

## 2021-01-14 ASSESSMENT — PAIN DESCRIPTION - PAIN TYPE: TYPE: ACUTE PAIN

## 2021-01-14 NOTE — ED PROVIDER NOTES
[facility]  [address] [facility]  Phone: [address]      Pt Name: Hallie Fitzgerald  MRN: [id number]  [place] 1970  Date of evaluation: 2021    CHIEF COMPLAINT       Chief Complaint   Patient presents with    Chest Pain     complaining of left sided chest pain while sitting at home with shortness of breath, states that he had a stress test completed last month with normal findings       HISTORY OF PRESENT ILLNESS    Hallie Fitzgerald is a 48 y.o. male who presents of left-sided chest pain. And is concerned that his brother  suddenly recently after being found to have coronary artery disease. Is currently in the middle of a work-up that includes getting a gated cardiac CT scan scheduled for tomorrow in [place]. Its that he has no risk factors other than his brother dying suddenly he has no history of hypercholesterolemia he is a non-smoker. Patient states that he started having twinges of pain in his left upper chest to come and go associated with some mild GERD type symptomatology that he refers to in the epigastrium area. Lasted less than 10 or 15 minutes and are now subsided. REVIEW OF SYSTEMS     Constitutional: No fevers or chills   HEENT: No sore throat, rhinorrhea, or earache   Eyes: No blurry vision or double vision no drainage   Cardiovascular: See above  Respiratory: No wheezing or shortness of breath no cough   Gastrointestinal: No nausea, vomiting, diarrhea, constipation, or abdominal pain   : No hematuria or dysuria   Musculoskeletal: No swelling or pain   Skin: No rash   Neurological: No focal neurologic complaints, paresthesias, weakness, or headache   PAST MEDICAL HISTORY    has a past medical history of Paroxysmal atrial fibrillation ([address] SURGICAL HISTORY      has a past surgical history that includes Vasectomy and Arm Surgery.     CURRENT MEDICATIONS       Previous Medications    ACETAMINOPHEN (TYLENOL 8 HOUR) 650 MG EXTENDED RELEASE TABLET    Take 650 mg by mouth every 8 hours as needed for Pain    ALLOPURINOL (ZYLOPRIM) 300 MG TABLET    TAKE ONE TABLET BY MOUTH DAILY    ASPIRIN 81 MG EC TABLET    Take 81 mg by mouth daily    OMEPRAZOLE (PRILOSEC) 20 MG DELAYED RELEASE CAPSULE    Take 1 capsule by mouth Daily       ALLERGIES     has No Known Allergies. FAMILY HISTORY     He indicated that the status of his father is unknown. He indicated that the status of his maternal grandmother is unknown.     family history includes Cancer in his father and maternal grandmother; High Blood Pressure in his father; Stroke in his maternal grandmother. SOCIAL HISTORY      reports that he has never smoked. He has never used smokeless tobacco. He reports current alcohol use. He reports that he does not use drugs. PHYSICAL EXAM       ED Triage Vitals [01/14/21 1331]   BP Temp Temp src Pulse Resp SpO2 Height Weight   (!) 154/93 98.5 °F (36.9 °C) -- 108 18 100 % 6' 3\" (1.905 m) 270 lb (122.5 kg)     Constitutional: Alert, oriented x3, nontoxic, answering questions appropriately, acting properly for age, in no acute distress   HEENT: Extraocular muscles intact, mucus membranes moist, TMs clear bilaterally, no posterior pharyngeal erythema or exudates, Pupils equal, round, reactive to light,   Neck: Trachea midline   Cardiovascular: Regular rhythm and rate no S3, S4, or murmurs   Respiratory: Clear to auscultation bilaterally no wheezes, rhonchi, rales, no respiratory distress no tachypnea no retractions no hypoxia  Gastrointestinal: Soft, nontender, nondistended, positive bowel sounds. No rebound, rigidity, or guarding.    Musculoskeletal: No extremity pain or swelling   Neurologic: Moving all 4 extremities without difficulty there are no gross focal neurologic deficits   Skin: Warm and dry     DIFFERENTIAL DIAGNOSIS/ MDM:   Atypical chest pain versus GERD versus underlying coronary artery disease versus possible anxiety associated      DIAGNOSTIC RESULTS     EKG: All EKG's are interpreted by the Emergency Department Physician who either signs or Co-signs this chart in the absence of a cardiologist.  EKG shows a normal sinus rhythm with a ventricular rate of 94. Your intervals 166, QRS duration is 98 and the axis is normal there is no acute ischemic pattern identified.       Not indicated unless otherwise documented above    LABS:  Results for orders placed or performed during the hospital encounter of 01/14/21   CBC Auto Differential   Result Value Ref Range    WBC 4.0 3.5 - 11.0 k/uL    RBC 5.48 4.5 - 5.9 m/uL    Hemoglobin 15.1 13.5 - 17.5 g/dL    Hematocrit 44.3 41 - 53 %    MCV 80.9 80 - 100 fL    MCH 27.6 26 - 34 pg    MCHC 34.1 31 - 37 g/dL    RDW 15.4 12.5 - 15.4 %    Platelets 980 999 - 564 k/uL    MPV 7.8 6.0 - 12.0 fL    NRBC Automated NOT REPORTED per 100 WBC    Differential Type NOT REPORTED     Seg Neutrophils 65 36 - 66 %    Lymphocytes 26 24 - 44 %    Monocytes 5 2 - 11 %    Eosinophils % 3 1 - 4 %    Basophils 1 0 - 2 %    Immature Granulocytes NOT REPORTED 0 %    Segs Absolute 2.60 1.8 - 7.7 k/uL    Absolute Lymph # 1.00 1.0 - 4.8 k/uL    Absolute Mono # 0.20 0.1 - 1.2 k/uL    Absolute Eos # 0.10 0.0 - 0.4 k/uL    Basophils Absolute 0.00 0.0 - 0.2 k/uL    Absolute Immature Granulocyte NOT REPORTED 0.00 - 0.30 k/uL    WBC Morphology NOT REPORTED     RBC Morphology NOT REPORTED     Platelet Estimate NOT REPORTED    Comprehensive Metabolic Panel w/ Reflex to MG   Result Value Ref Range    Glucose 160 (H) 70 - 99 mg/dL    BUN 26 (H) 6 - 20 mg/dL    CREATININE 1.00 0.70 - 1.20 mg/dL    Bun/Cre Ratio NOT REPORTED 9 - 20    Calcium 9.5 8.6 - 10.4 mg/dL    Sodium 139 135 - 144 mmol/L    Potassium 3.9 3.7 - 5.3 mmol/L    Chloride 101 98 - 107 mmol/L    CO2 24 20 - 31 mmol/L    Anion Gap 14 9 - 17 mmol/L    Alkaline Phosphatase 63 40 - 129 U/L    ALT 28 5 - 41 U/L    AST 21 <40 U/L    Total Bilirubin 1.20 0.3 - 1.2 mg/dL Total Protein 7.5 6.4 - 8.3 g/dL    Alb 4.5 3.5 - 5.2 g/dL    Albumin/Globulin Ratio 1.5 1.0 - 2.5    GFR Non-African American >60 >60 mL/min    GFR African American >60 >60 mL/min    GFR Comment          GFR Staging NOT REPORTED    Troponin   Result Value Ref Range    Troponin, High Sensitivity <6 0 - 22 ng/L    Troponin T NOT REPORTED <0.03 ng/mL    Troponin Interp NOT REPORTED    Lipase   Result Value Ref Range    Lipase 41 13 - 60 U/L   D-Dimer, Quantitative   Result Value Ref Range    D-Dimer, Quant <0.19 mg/L FEU   Troponin   Result Value Ref Range    Troponin, High Sensitivity <6 0 - 22 ng/L    Troponin T NOT REPORTED <0.03 ng/mL    Troponin Interp NOT REPORTED    EKG 12 Lead   Result Value Ref Range    Ventricular Rate 94 BPM    Atrial Rate 94 BPM    P-R Interval 166 ms    QRS Duration 98 ms    Q-T Interval 350 ms    QTc Calculation (Bazett) 437 ms    P Axis 66 degrees    R Axis -28 degrees    T Axis 48 degrees       Not indicated unless otherwise documented above    RADIOLOGY:   I reviewed the radiologist interpretations:    XR CHEST PORTABLE   Final Result   No evidence of acute cardiopulmonary disease. Not indicated unless otherwise documented above    EMERGENCY DEPARTMENT COURSE:     The patient was given the following medications:  Orders Placed This Encounter   Medications    aspirin chewable tablet 324 mg    naproxen (NAPROSYN) 500 MG tablet     Sig: Take 1 tablet by mouth 2 times daily as needed for Pain     Dispense:  20 tablet     Refill:  0        Vitals:   -------------------------  BP (!) 133/95   Pulse 80   Temp 98.5 °F (36.9 °C)   Resp 16   Ht 6' 3\" (1.905 m)   Wt 122.5 kg (270 lb)   SpO2 98%   BMI 33.75 kg/m²         I have reviewed the disposition diagnosis with the patient and or their family/guardian. I have answered their questions and given discharge instructions.  They voiced understanding of these instructions and did not have any furtherquestions or complaints. CRITICAL CARE:    None    CONSULTS:    None    PROCEDURES:    None      OARRS Report if indicated             FINAL IMPRESSION      1. Chest wall pain          DISPOSITION/PLAN   DISPOSITION Decision To Discharge 01/14/2021 04:38:55 PM        CONDITION ON DISPOSITION: STABLE       PATIENT REFERRED TO:  No follow-up provider specified.     DISCHARGE MEDICATIONS:  New Prescriptions    NAPROXEN (NAPROSYN) 500 MG TABLET    Take 1 tablet by mouth 2 times daily as needed for Pain       (Please note that portions of thisnote were completed with a voice recognition program.  Efforts were made to edit the dictations but occasionally words are mis-transcribed.)    Oliver MD  Attending Emergency Physician        Sabrina Quinn MD  01/14/21 [id number] Argenis Lieberman MD  01/14/21 1345

## 2021-01-16 LAB
EKG ATRIAL RATE: 94 BPM
EKG P AXIS: 66 DEGREES
EKG P-R INTERVAL: 166 MS
EKG Q-T INTERVAL: 350 MS
EKG QRS DURATION: 98 MS
EKG QTC CALCULATION (BAZETT): 437 MS
EKG R AXIS: -28 DEGREES
EKG T AXIS: 48 DEGREES
EKG VENTRICULAR RATE: 94 BPM

## 2021-06-23 ENCOUNTER — OFFICE VISIT (OUTPATIENT)
Dept: FAMILY MEDICINE CLINIC | Age: 51
End: 2021-06-23
Payer: COMMERCIAL

## 2021-06-23 VITALS
HEART RATE: 74 BPM | DIASTOLIC BLOOD PRESSURE: 60 MMHG | SYSTOLIC BLOOD PRESSURE: 112 MMHG | WEIGHT: 245 LBS | OXYGEN SATURATION: 98 % | BODY MASS INDEX: 30.62 KG/M2 | TEMPERATURE: 97.8 F

## 2021-06-23 DIAGNOSIS — K21.9 GASTROESOPHAGEAL REFLUX DISEASE WITHOUT ESOPHAGITIS: Primary | ICD-10-CM

## 2021-06-23 DIAGNOSIS — Z12.11 COLON CANCER SCREENING: ICD-10-CM

## 2021-06-23 DIAGNOSIS — M1A.09X0 CHRONIC GOUT OF MULTIPLE SITES, UNSPECIFIED CAUSE: ICD-10-CM

## 2021-06-23 DIAGNOSIS — I48.0 PAROXYSMAL ATRIAL FIBRILLATION (HCC): ICD-10-CM

## 2021-06-23 PROCEDURE — 99214 OFFICE O/P EST MOD 30 MIN: CPT | Performed by: FAMILY MEDICINE

## 2021-06-23 ASSESSMENT — PATIENT HEALTH QUESTIONNAIRE - PHQ9
1. LITTLE INTEREST OR PLEASURE IN DOING THINGS: 0
2. FEELING DOWN, DEPRESSED OR HOPELESS: 0
SUM OF ALL RESPONSES TO PHQ QUESTIONS 1-9: 0
SUM OF ALL RESPONSES TO PHQ QUESTIONS 1-9: 0
SUM OF ALL RESPONSES TO PHQ9 QUESTIONS 1 & 2: 0
SUM OF ALL RESPONSES TO PHQ QUESTIONS 1-9: 0

## 2021-06-23 NOTE — PROGRESS NOTES
abnormal lymphadenopathy. No JVD noted. Carotids are clear bilaterally. No thyroid masses noted. Heart: RRR without murmur. No S3, S4, or gallop noted. Chest: Clear to auscultation bilaterally. Good breath sounds noted. No rales, wheezes, or rhonchi noted. No respiratory retractions noted. Wall has symmetrical movement with respirations. No pedal edema  Assessment:   Encounter Diagnoses   Name Primary?  Gastroesophageal reflux disease without esophagitis Yes    Chronic gout of multiple sites, unspecified cause     Paroxysmal atrial fibrillation (HCC)     Colon cancer screening          Plan:   There are no discontinued medications. THE ABOVE NOTED DISCONTINUED MEDS MAY ONLY BE FROM 'CLEANING UP' THE MED LIST AND WERE NOT ACTUALLY CANCELED;  SEE CHART FOR DETAILS! No orders of the defined types were placed in this encounter. Orders Placed This Encounter   Procedures   Betito Rubio MD, Gastroenterology, [address]     Referral Priority:   Routine     Referral Type:   Eval and Treat     Referral Reason:   Specialty Services Required     Referred to Provider:   Elijah Flores MD     Requested Specialty:   Gastroenterology     Number of Visits Requested:   1     No follow-ups on file. There are no Patient Instructions on file for this visit.   Data Unavailable

## 2021-06-28 RX ORDER — ALLOPURINOL 300 MG/1
TABLET ORAL
Qty: 90 TABLET | Refills: 0 | Status: SHIPPED | OUTPATIENT
Start: 2021-06-28 | End: 2021-11-09 | Stop reason: SDUPTHER

## 2021-06-28 NOTE — TELEPHONE ENCOUNTER
Paty Moser is calling to request a refill on the following medication(s):    Medication Request:  Requested Prescriptions     Pending Prescriptions Disp Refills    allopurinol (ZYLOPRIM) 300 MG tablet [Pharmacy Med Name: ALLOPURINOL 300 MG TABLET] 90 tablet 0     Sig: TAKE ONE TABLET BY MOUTH DAILY       Last Visit Date (If Applicable):  5/79/0905    Next Visit Date:    Visit date not found

## 2021-07-26 ENCOUNTER — TELEPHONE (OUTPATIENT)
Dept: GASTROENTEROLOGY | Age: 51
End: 2021-07-26

## 2021-07-26 NOTE — TELEPHONE ENCOUNTER
Pt lvm to schedule Colon cancer screening from a referral that his doctor sent over. Requests call back.

## 2021-07-28 NOTE — TELEPHONE ENCOUNTER
Called and spoke to pt to discuss scheduling colon screen per referral. Colon screen questionnaire completed and pt ok to schedule w/o ov. Pt offered 8/2/21 @ 1130am but reports he works that day. Pt wants to get it done asap and plans to call to see if he can switch days off so he can take the date/time. Pt stating he will call office back to schedule. Pt returned call and confirmed he can take 8/2/21 procedure date. Pt now scheduled STA 8/2/21 @ 1130am colon screen F Ahmad- vaccinated. Reviewed bowel prep instructions over the phone and emailed copy to [email or web address]  Pt had no further questions

## 2021-08-02 ENCOUNTER — ANESTHESIA (OUTPATIENT)
Dept: OPERATING ROOM | Age: 51
End: 2021-08-02
Payer: COMMERCIAL

## 2021-08-02 ENCOUNTER — ANESTHESIA EVENT (OUTPATIENT)
Dept: OPERATING ROOM | Age: 51
End: 2021-08-02
Payer: COMMERCIAL

## 2021-08-02 ENCOUNTER — HOSPITAL ENCOUNTER (OUTPATIENT)
Age: 51
Setting detail: OUTPATIENT SURGERY
Discharge: HOME | End: 2021-08-02
Attending: INTERNAL MEDICINE | Admitting: INTERNAL MEDICINE
Payer: COMMERCIAL

## 2021-08-02 VITALS
RESPIRATION RATE: 13 BRPM | DIASTOLIC BLOOD PRESSURE: 88 MMHG | SYSTOLIC BLOOD PRESSURE: 114 MMHG | HEIGHT: 75 IN | BODY MASS INDEX: 30.84 KG/M2 | TEMPERATURE: 96.8 F | HEART RATE: 73 BPM | OXYGEN SATURATION: 96 % | WEIGHT: 248 LBS

## 2021-08-02 VITALS
DIASTOLIC BLOOD PRESSURE: 61 MMHG | SYSTOLIC BLOOD PRESSURE: 126 MMHG | OXYGEN SATURATION: 99 % | RESPIRATION RATE: 21 BRPM

## 2021-08-02 PROCEDURE — 7100000010 HC PHASE II RECOVERY - FIRST 15 MIN: Performed by: INTERNAL MEDICINE

## 2021-08-02 PROCEDURE — 3700000000 HC ANESTHESIA ATTENDED CARE: Performed by: INTERNAL MEDICINE

## 2021-08-02 PROCEDURE — 2580000003 HC RX 258: Performed by: ANESTHESIOLOGY

## 2021-08-02 PROCEDURE — 7100000011 HC PHASE II RECOVERY - ADDTL 15 MIN: Performed by: INTERNAL MEDICINE

## 2021-08-02 PROCEDURE — 3700000001 HC ADD 15 MINUTES (ANESTHESIA): Performed by: INTERNAL MEDICINE

## 2021-08-02 PROCEDURE — 88305 TISSUE EXAM BY PATHOLOGIST: CPT

## 2021-08-02 PROCEDURE — 3609010500 HC COLONOSCOPY POLYPECTOMY REMOVAL HOT BIOPSY/STOMA: Performed by: INTERNAL MEDICINE

## 2021-08-02 PROCEDURE — 45380 COLONOSCOPY AND BIOPSY: CPT | Performed by: INTERNAL MEDICINE

## 2021-08-02 PROCEDURE — 2709999900 HC NON-CHARGEABLE SUPPLY: Performed by: INTERNAL MEDICINE

## 2021-08-02 PROCEDURE — 45385 COLONOSCOPY W/LESION REMOVAL: CPT | Performed by: INTERNAL MEDICINE

## 2021-08-02 PROCEDURE — 2500000003 HC RX 250 WO HCPCS

## 2021-08-02 PROCEDURE — 6360000002 HC RX W HCPCS

## 2021-08-02 RX ORDER — ONDANSETRON 2 MG/ML
4 INJECTION INTRAMUSCULAR; INTRAVENOUS
Status: DISCONTINUED | OUTPATIENT
Start: 2021-08-02 | End: 2021-08-02 | Stop reason: HOSPADM

## 2021-08-02 RX ORDER — SODIUM CHLORIDE, SODIUM LACTATE, POTASSIUM CHLORIDE, CALCIUM CHLORIDE 600; 310; 30; 20 MG/100ML; MG/100ML; MG/100ML; MG/100ML
INJECTION, SOLUTION INTRAVENOUS CONTINUOUS
Status: DISCONTINUED | OUTPATIENT
Start: 2021-08-02 | End: 2021-08-02 | Stop reason: HOSPADM

## 2021-08-02 RX ORDER — LIDOCAINE HYDROCHLORIDE 10 MG/ML
INJECTION, SOLUTION EPIDURAL; INFILTRATION; INTRACAUDAL; PERINEURAL PRN
Status: DISCONTINUED | OUTPATIENT
Start: 2021-08-02 | End: 2021-08-02 | Stop reason: SDUPTHER

## 2021-08-02 RX ORDER — PROPOFOL 10 MG/ML
INJECTION, EMULSION INTRAVENOUS PRN
Status: DISCONTINUED | OUTPATIENT
Start: 2021-08-02 | End: 2021-08-02 | Stop reason: SDUPTHER

## 2021-08-02 RX ORDER — LIDOCAINE HYDROCHLORIDE 10 MG/ML
5 INJECTION, SOLUTION INFILTRATION; PERINEURAL ONCE
Status: DISCONTINUED | OUTPATIENT
Start: 2021-08-02 | End: 2021-08-02 | Stop reason: HOSPADM

## 2021-08-02 RX ADMIN — PROPOFOL 20 MG: 10 INJECTION, EMULSION INTRAVENOUS at 10:23

## 2021-08-02 RX ADMIN — LIDOCAINE HYDROCHLORIDE 50 MG: 10 INJECTION, SOLUTION EPIDURAL; INFILTRATION; INTRACAUDAL; PERINEURAL at 10:22

## 2021-08-02 RX ADMIN — PROPOFOL 20 MG: 10 INJECTION, EMULSION INTRAVENOUS at 10:27

## 2021-08-02 RX ADMIN — PROPOFOL 20 MG: 10 INJECTION, EMULSION INTRAVENOUS at 10:41

## 2021-08-02 RX ADMIN — PROPOFOL 20 MG: 10 INJECTION, EMULSION INTRAVENOUS at 10:39

## 2021-08-02 RX ADMIN — SODIUM CHLORIDE, POTASSIUM CHLORIDE, SODIUM LACTATE AND CALCIUM CHLORIDE: 600; 310; 30; 20 INJECTION, SOLUTION INTRAVENOUS at 09:57

## 2021-08-02 RX ADMIN — PROPOFOL 20 MG: 10 INJECTION, EMULSION INTRAVENOUS at 10:37

## 2021-08-02 RX ADMIN — PROPOFOL 20 MG: 10 INJECTION, EMULSION INTRAVENOUS at 10:29

## 2021-08-02 RX ADMIN — PROPOFOL 20 MG: 10 INJECTION, EMULSION INTRAVENOUS at 10:25

## 2021-08-02 RX ADMIN — PROPOFOL 20 MG: 10 INJECTION, EMULSION INTRAVENOUS at 10:43

## 2021-08-02 RX ADMIN — PROPOFOL 20 MG: 10 INJECTION, EMULSION INTRAVENOUS at 10:33

## 2021-08-02 RX ADMIN — PROPOFOL 20 MG: 10 INJECTION, EMULSION INTRAVENOUS at 10:35

## 2021-08-02 RX ADMIN — PROPOFOL 20 MG: 10 INJECTION, EMULSION INTRAVENOUS at 10:31

## 2021-08-02 RX ADMIN — PROPOFOL 100 MG: 10 INJECTION, EMULSION INTRAVENOUS at 10:22

## 2021-08-02 RX ADMIN — PROPOFOL 20 MG: 10 INJECTION, EMULSION INTRAVENOUS at 10:45

## 2021-08-02 ASSESSMENT — PULMONARY FUNCTION TESTS
PIF_VALUE: 1
PIF_VALUE: 0
PIF_VALUE: 1

## 2021-08-02 ASSESSMENT — PAIN SCALES - GENERAL
PAINLEVEL_OUTOF10: 0
PAINLEVEL_OUTOF10: 0

## 2021-08-02 ASSESSMENT — PAIN - FUNCTIONAL ASSESSMENT: PAIN_FUNCTIONAL_ASSESSMENT: 0-10

## 2021-08-02 NOTE — H&P
History and Physical Service   [facility]IENT    HISTORY AND PHYSICAL EXAMINATION            Date of Evaluation: 8/2/2021  Patient name:  Mike Yoder  MRN:   [id number]  YOB: 1970  PCP:    Nikki Palm MD    History Obtained From:     Patient    History of Present Illness: This is Mike Yoder a 48 y.o. male who presents today for a colorectal cancer screening, not high risk by Dr. Megan Larson for screening. The patient completed the bowel prep as directed and now has watery yellow stool. The pt's paternal uncle had a history of colon cancer. This is the pt's first colonoscopy. Pt denies black tarry stools, diarrhea, constipation, nausea, vomiting, fever, chills, night sweats, bloating, abdominal pain, and unexplained weight loss. Pt takes Prilosec for GERD. Pt denies history of ulcers, hiatal hernia, IBS, and diabetes. History of paroxysmal atrial fibrillation. S/p ablation of dysrhythmic focus in 2017. Aspirin and naproxen were last taken on 07/28/2021. Past Medical History:     Past Medical History:   Diagnosis Date    COVID-19 10/2020    GERD (gastroesophageal reflux disease)     Gout of foot     Paroxysmal atrial fibrillation [facility])         Past Surgical History:     Past Surgical History:   Procedure Laterality Date    ABLATION OF DYSRHYTHMIC FOCUS  2017    VASECTOMY          Medications Prior to Admission:     Prior to Admission medications    Medication Sig Start Date End Date Taking?  Authorizing Provider   bisacodyl (BISACODYL) 5 MG EC tablet Take 4 tablets in the morning 7/28/21  Yes Eula Keller MD   polyethylene glycol [facility]) 17 GM/SCOOP powder Follow Instructions provided by physician's office 7/28/21  Yes Eula Keller MD   allopurinol (ZYLOPRIM) 300 MG tablet TAKE ONE TABLET BY MOUTH DAILY 6/28/21  Yes Nikki Palm MD   acetaminophen (TYLENOL 8 HOUR) 650 MG extended release tablet Take 650 mg by mouth every 8 hours as needed for Pain   Yes Historical Provider, MD   omeprazole (PRILOSEC) 20 MG delayed release capsule Take 1 capsule by mouth Daily 8/24/20  Yes Cecille Cheema MD   aspirin 81 MG EC tablet Take 81 mg by mouth daily    Historical Provider, MD   naproxen (NAPROSYN) 500 MG tablet Take 1 tablet by mouth 2 times daily as needed for Pain 1/14/21 6/23/21  Aj Scott MD        Allergies:     Patient has no known allergies. Social History:     Tobacco:    reports that he has never smoked. He has never used smokeless tobacco.  Alcohol:      reports current alcohol use. Drug Use:  reports no history of drug use. Family History:     Family History   Problem Relation Age of Onset    Cancer Father         prostate cancer    High Blood Pressure Father     Cancer Maternal Grandmother         skin cancer    Stroke Maternal Grandmother        Review of Systems:     Positive and Negative as described in HPI. CONSTITUTIONAL: Negative for fevers, chills, sweats, fatigue, and weight loss. HEENT: Mild hearing loss. Pt wears reading glasses. Negative for rhinorrhea and throat pain  RESPIRATORY: Negative for shortness of breath, cough, congestion, and wheezing. CARDIOVASCULAR: History of atrial fibrillation. Negative for chest pain, blood clot, and palpitations. GASTROINTESTINAL: See HPI. GENITOURINARY: Negative for difficulty of urination, burning with urination,and frequency. INTEGUMENT: Negative for rash, skin lesions, and easy bruising. HEMATOLOGIC/LYMPHATIC: Negative for swelling/edema. ALLERGIC/IMMUNOLOGIC: Negative for urticaria and itching. ENDOCRINE: Negative for increase in drinking, increase in urination, heat or cold intolerance. MUSCULOSKELETAL: Chronic low back pain. NEUROLOGICAL: Headache 2/10 on the pain scale. Negative for dizziness, lightheadedness, numbness, and tingling extremities. Pt denies history of seizures and strokes. BEHAVIOR/PSYCH: Negative for depression and anxiety.     Physical Exam:   /79 Pulse 91   Temp 96.8 °F (36 °C) (Temporal)   Resp 16   Ht 6' 3\" (1.905 m)   Wt 248 lb (112.5 kg)   SpO2 97%   BMI 31.00 kg/m²   No results for input(s): POCGLU in the last 72 hours. General Appearance:  Alert, well appearing, and in no acute distress. Obese. Mental status: Oriented to person, place, and time. Head: Normocephalic and atraumatic. Eye: No icterus, redness, pupils equal and reactive, extraocular eye movements intact, and conjunctiva clear. Ear: Hearing grossly intact. Nose: No drainage noted. Mouth: Mucous membranes moist.  Neck: Supple and no carotid bruits noted. Lungs: Bilateral equal air entry, clear to auscultation, no wheezing, rales or rhonchi, and normal effort. Cardiovascular: Normal rate, regular rhythm, no murmur, gallop, and rub. Abdomen: Soft, nontender, nondistended, and active bowel sounds. Neurologic: Normal speech and cranial nerves II through XII grossly intact. Skin: No gross lesions, rashes, bruising, or bleeding on exposed skin area. Extremities: Posterior tibial pulses 2+ bilaterally. No pedal edema. No calf tenderness with palpation. Psych: Normal affect. Investigations:      Laboratory Testing:  No results found for this or any previous visit (from the past 24 hour(s)). No results for input(s): HGB, HCT, WBC, MCV, PLATELET, NA, K, CL, CO2, BUN, CREATININE, GLUCOSE, INR, PROTIME, APTT, AST, ALT, LABALBU, HCG in the last 720 hours. No results for input(s): COVID19 in the last 720 hours. Diagnosis:      1. Screening    Plans:     1.  Colorectal cancer screening, not high risk      TAMIKO Hart CNP  8/2/2021  10:02 AM

## 2021-08-02 NOTE — ANESTHESIA POSTPROCEDURE EVALUATION
Department of Anesthesiology  Postprocedure Note    Patient: Jaden De Anda  MRN: [id number]  YOB: 1970  Date of evaluation: 8/2/2021  Time:  12:29 PM     Procedure Summary     Date: 08/02/21 Room / Location: [place] [address] / [facility] - INPATIENT    Anesthesia Start: 2990 Anesthesia Stop: 5    Procedure: COLONOSCOPY POLYPECTOMY REMOVAL HOT BIOPSY/ (N/A ) Diagnosis: (DX SCREENING)    Surgeons: Yury Lopez MD Responsible Provider: Noemy Kincaid MD    Anesthesia Type: MAC ASA Status: 3          Anesthesia Type: MAC    Carmen Phase I: Carmen Score: 10    Carmen Phase II: Carmen Score: 8    Last vitals: Reviewed and per EMR flowsheets.        Anesthesia Post Evaluation    Complications: no

## 2021-08-02 NOTE — OP NOTE
PROCEDURE NOTE    DATE OF PROCEDURE: 8/2/2021    SURGEON: Diane Acosta MD    ASSISTANT: None    PREOPERATIVE DIAGNOSIS: SCREENING  FAMILY HX OF COLON CANCER    POSTOPERATIVE DIAGNOSIS: as described below    OPERATION: Total colonoscopy SNARE CAUTERY POLYPECTOMY AND BIOPSIES    ANESTHESIA: MAC PER ANESTHESIA     ESTIMATED BLOOD LOSS: less than 50     COMPLICATIONS: None. SPECIMENS:  Was Obtained:     HISTORY: The patient is a 48y.o. year old male with history of above preop diagnosis. I recommended colonoscopy with possible biopsy or polypectomy and I explained the risk, benefits, expected outcome, and alternatives to the procedure. Risks included but are not limited to bleeding, infection, respiratory distress, hypotension, and perforation of the colon and possibility of missing a lesion. The patient understands and is in agreement. PROCEDURE: The patient was given IV conscious sedation. The patient's SPO2 remained above 90% throughout the procedure. The colonoscope was inserted per rectum and advanced under direct vision to the cecum without difficulty. The prep was good. Findings:  Terminal ileum: normal    Cecum/Ascending colon: normal    Transverse colon: normal    Descending/Sigmoid colon: abnormal: DIVERTICULOSIS    AT 25 CM A 1.5 CM ELONGATED ERYTHEMATOUS POLYP WAS REMOVED WITH SNARE CAUTERY     Rectum/Anus: examined in normal and retroflexed positions and was abnormal: 3 MM POLYP WAS BIOPSIED     MOD INT HEMORRHOIDS    Withdrawal Time was (minutes): 13    The colon was decompressed and the scope was removed. The patient tolerated the procedure well. Recommendations/Plan:   1. Lifestyle and dietary modifications as discussed  2. F/U In Office in 3-4 weeks  3. Discussed with the family  4. Colonoscopy Recall :2-3 year  5. POST SEDATION PATIENT WAS STABLE WITH STABLE VITAL SIGNS AND OXYGEN SATURATIONS AND WAS DISCHARGED HOME WITH RIDE IN A STABLE CONDITION.     Electronically signed by Guru Mendoza MD  on 8/2/2021 at 10:53 AM

## 2021-08-02 NOTE — ANESTHESIA PRE PROCEDURE
Department of Anesthesiology  Preprocedure Note       Name:  Susan Eubanks   Age:  48 y.o.  :  1970                                          MRN:  [id number]         Date:  2021      Surgeon: Chay Littlejohn):  Wili Frank MD    Procedure: Procedure(s):  COLORECTAL CANCER SCREENING, NOT HIGH RISK    Medications prior to admission:   Prior to Admission medications    Medication Sig Start Date End Date Taking? Authorizing Provider   bisacodyl (BISACODYL) 5 MG EC tablet Take 4 tablets in the morning 21  Yes Wili Frank MD   polyethylene glycol [facility]) 17 GM/SCOOP powder Follow Instructions provided by physician's office 21  Yes Wili Frank MD   allopurinol (ZYLOPRIM) 300 MG tablet TAKE ONE TABLET BY MOUTH DAILY 21  Yes Khris Mendoza MD   acetaminophen (TYLENOL 8 HOUR) 650 MG extended release tablet Take 650 mg by mouth every 8 hours as needed for Pain   Yes Historical Provider, MD   omeprazole (PRILOSEC) 20 MG delayed release capsule Take 1 capsule by mouth Daily 20  Yes Khris Mendoza MD   aspirin 81 MG EC tablet Take 81 mg by mouth daily    Historical Provider, MD   naproxen (NAPROSYN) 500 MG tablet Take 1 tablet by mouth 2 times daily as needed for Pain 21  Robin Reece MD       Current medications:    Current Facility-Administered Medications   Medication Dose Route Frequency Provider Last Rate Last Admin    lactated ringers infusion   Intravenous Continuous Jose Francisco Alaniz  mL/hr at 21 0957 New Bag at 21 0957    lidocaine 1 % injection 5 mL  5 mL Intradermal Once Jose Francisco Alaniz MD           Allergies:  No Known Allergies    Problem List:    Patient Active Problem List   Diagnosis Code    Chronic gout of multiple sites M1A. 09X0    Paroxysmal atrial fibrillation (HCC) I48.0    BRCA gene mutation positive in male Z15.01, Z15.03, Z15.09       Past Medical History:        Diagnosis Date    COVID-19 10/2020    GERD (gastroesophageal reflux disease)     Gout of foot     Paroxysmal atrial fibrillation ([facility] [address]        Past Surgical History:        Procedure Laterality Date    ABLATION OF DYSRHYTHMIC FOCUS  2017    VASECTOMY         Social History:    Social History     Tobacco Use    Smoking status: Never Smoker    Smokeless tobacco: Never Used   Substance Use Topics    Alcohol use: Yes     Comment: socially                                Counseling given: Not Answered      Vital Signs (Current):   Vitals:    08/02/21 0946 08/02/21 0951   BP: 137/79    Pulse: 91    Resp: 16    Temp: 96.8 °F (36 °C)    TempSrc: Temporal    SpO2: 97%    Weight:  248 lb (112.5 kg)   Height:  6' 3\" (1.905 m)                                              BP Readings from Last 3 Encounters:   08/02/21 137/79   06/23/21 112/60   01/14/21 (!) 133/95       NPO Status: Time of last liquid consumption: 2100                        Time of last solid consumption: 2200                        Date of last liquid consumption: 08/01/21                        Date of last solid food consumption: 07/31/21    BMI:   Wt Readings from Last 3 Encounters:   08/02/21 248 lb (112.5 kg)   06/23/21 245 lb (111.1 kg)   01/14/21 270 lb (122.5 kg)     Body mass index is 31 kg/m².     CBC:   Lab Results   Component Value Date    WBC 4.0 01/14/2021    RBC 5.48 01/14/2021    RBC 5.06 03/14/2012    HGB 15.1 01/14/2021    HCT 44.3 01/14/2021    MCV 80.9 01/14/2021    RDW 15.4 01/14/2021     01/14/2021     03/14/2012       CMP:   Lab Results   Component Value Date     01/14/2021    K 3.9 01/14/2021     01/14/2021    CO2 24 01/14/2021    BUN 26 01/14/2021    CREATININE 1.00 01/14/2021    GFRAA >60 01/14/2021    LABGLOM >60 01/14/2021    GLUCOSE 160 01/14/2021    GLUCOSE 92 03/14/2012    PROT 7.5 01/14/2021    CALCIUM 9.5 01/14/2021    BILITOT 1.20 01/14/2021    ALKPHOS 63 01/14/2021    AST 21 01/14/2021    ALT 28 01/14/2021       POC Tests: No results for input(s): POCGLU, POCNA, POCK, POCCL, POCBUN, POCHEMO, POCHCT in the last 72 hours. Coags: No results found for: PROTIME, INR, APTT    HCG (If Applicable): No results found for: PREGTESTUR, PREGSERUM, HCG, HCGQUANT     ABGs: No results found for: PHART, PO2ART, FCQ5ICH, EVW1NWO, BEART, N3AYVDHX     Type & Screen (If Applicable):  No results found for: LABABO, LABRH    Drug/Infectious Status (If Applicable):  No results found for: HIV, HEPCAB    COVID-19 Screening (If Applicable):   Lab Results   Component Value Date    COVID19 DETECTED 10/25/2020           Anesthesia Evaluation    Airway: Mallampati: I  TM distance: >3 FB   Neck ROM: full  Mouth opening: > = 3 FB Dental:          Pulmonary:Negative Pulmonary ROS                              Cardiovascular:    (+) dysrhythmias: atrial fibrillation,     (-)  angina                Neuro/Psych:               GI/Hepatic/Renal:             Endo/Other:                     Abdominal:             Vascular:           Other Findings:             Anesthesia Plan      MAC     ASA 3                                 Savita Carver MD   8/2/2021

## 2021-08-03 LAB — SURGICAL PATHOLOGY REPORT: NORMAL

## 2021-11-09 RX ORDER — ALLOPURINOL 300 MG/1
300 TABLET ORAL DAILY
Qty: 90 TABLET | Refills: 1 | Status: SHIPPED | OUTPATIENT
Start: 2021-11-09 | End: 2022-08-22 | Stop reason: SDUPTHER

## 2022-05-23 ENCOUNTER — OFFICE VISIT (OUTPATIENT)
Dept: INTERNAL MEDICINE CLINIC | Age: 52
End: 2022-05-23
Payer: COMMERCIAL

## 2022-05-23 VITALS
TEMPERATURE: 97.3 F | RESPIRATION RATE: 20 BRPM | SYSTOLIC BLOOD PRESSURE: 132 MMHG | WEIGHT: 286.6 LBS | HEIGHT: 75 IN | BODY MASS INDEX: 35.63 KG/M2 | HEART RATE: 92 BPM | OXYGEN SATURATION: 98 % | DIASTOLIC BLOOD PRESSURE: 80 MMHG

## 2022-05-23 DIAGNOSIS — M79.18 MUSCULOSKELETAL PAIN: ICD-10-CM

## 2022-05-23 DIAGNOSIS — M1A.09X0 IDIOPATHIC CHRONIC GOUT OF MULTIPLE SITES WITHOUT TOPHUS: Chronic | ICD-10-CM

## 2022-05-23 DIAGNOSIS — K21.9 GERD WITHOUT ESOPHAGITIS: ICD-10-CM

## 2022-05-23 DIAGNOSIS — Z76.89 ENCOUNTER TO ESTABLISH CARE WITH NEW DOCTOR: Primary | ICD-10-CM

## 2022-05-23 DIAGNOSIS — M10.00 IDIOPATHIC GOUT, UNSPECIFIED CHRONICITY, UNSPECIFIED SITE: ICD-10-CM

## 2022-05-23 DIAGNOSIS — Z98.890 H/O CARDIAC RADIOFREQUENCY ABLATION: ICD-10-CM

## 2022-05-23 DIAGNOSIS — H61.23 BILATERAL IMPACTED CERUMEN: ICD-10-CM

## 2022-05-23 DIAGNOSIS — I48.0 PAROXYSMAL ATRIAL FIBRILLATION (HCC): Chronic | ICD-10-CM

## 2022-05-23 PROCEDURE — 99204 OFFICE O/P NEW MOD 45 MIN: CPT | Performed by: FAMILY MEDICINE

## 2022-05-23 SDOH — ECONOMIC STABILITY: FOOD INSECURITY: WITHIN THE PAST 12 MONTHS, YOU WORRIED THAT YOUR FOOD WOULD RUN OUT BEFORE YOU GOT MONEY TO BUY MORE.: NEVER TRUE

## 2022-05-23 SDOH — ECONOMIC STABILITY: FOOD INSECURITY: WITHIN THE PAST 12 MONTHS, THE FOOD YOU BOUGHT JUST DIDN'T LAST AND YOU DIDN'T HAVE MONEY TO GET MORE.: NEVER TRUE

## 2022-05-23 ASSESSMENT — ENCOUNTER SYMPTOMS
GASTROINTESTINAL NEGATIVE: 1
EYES NEGATIVE: 1
RESPIRATORY NEGATIVE: 1
ALLERGIC/IMMUNOLOGIC NEGATIVE: 1

## 2022-05-23 ASSESSMENT — SOCIAL DETERMINANTS OF HEALTH (SDOH): HOW HARD IS IT FOR YOU TO PAY FOR THE VERY BASICS LIKE FOOD, HOUSING, MEDICAL CARE, AND HEATING?: NOT HARD AT ALL

## 2022-05-23 NOTE — PROGRESS NOTES
Subjective:      Patient ID: Jud He is a 46 y.o. male. Ear Fullness   There is pain in both ears. This is a new problem. The current episode started more than 1 month ago. The problem occurs constantly. The problem has been unchanged. There has been no fever. The pain is at a severity of 0/10. The patient is experiencing no pain. Treatments tried: Over-the-counter hydrogen peroxide. The treatment provided no relief. His past medical history is significant for hearing loss. Review of Systems   Constitutional: Negative. HENT: Negative. Eyes: Negative. Respiratory: Negative. Cardiovascular: Negative. Gastrointestinal: Negative. Endocrine: Negative. Musculoskeletal: Negative. Skin: Negative. Allergic/Immunologic: Negative. Neurological: Negative. Hematological: Negative. Psychiatric/Behavioral: Negative. Past family and social history unremarkable. Diagnosis Orders   1. Encounter to establish care with new doctor     2. Bilateral impacted cerumen     3. Paroxysmal atrial fibrillation (HCC)     4. H/O cardiac radiofrequency ablation     5. Idiopathic chronic gout of multiple sites without tophus     6. GERD without esophagitis     7. Idiopathic gout, unspecified chronicity, unspecified site     8. Musculoskeletal pain           Objective:   Physical Exam  Vitals and nursing note reviewed. Constitutional:       Appearance: He is well-developed. Comments: Increased BMI   HENT:      Head: Normocephalic and atraumatic. Comments: Bilateral cerumen impaction     Right Ear: External ear normal.      Left Ear: External ear normal.      Nose: Nose normal.   Eyes:      Conjunctiva/sclera: Conjunctivae normal.      Pupils: Pupils are equal, round, and reactive to light. Cardiovascular:      Rate and Rhythm: Normal rate and regular rhythm. Heart sounds: Normal heart sounds.       Comments: History of A. fib status post radiofrequency ablation  Hypertension  Pulmonary:      Effort: Pulmonary effort is normal.      Breath sounds: Normal breath sounds. Abdominal:      General: Bowel sounds are normal.      Palpations: Abdomen is soft. Comments: GERD   Musculoskeletal:         General: Normal range of motion. Cervical back: Normal range of motion and neck supple. Comments: Gout   Skin:     General: Skin is warm and dry. Neurological:      Mental Status: He is alert and oriented to person, place, and time. Deep Tendon Reflexes: Reflexes are normal and symmetric. Psychiatric:         Behavior: Behavior normal.         Thought Content: Thought content normal.         Assessment:       Diagnosis Orders   1. Encounter to establish care with new doctor     2. Bilateral impacted cerumen     3. Paroxysmal atrial fibrillation (HCC)     4. H/O cardiac radiofrequency ablation     5. Idiopathic chronic gout of multiple sites without tophus     6. GERD without esophagitis     7. Idiopathic gout, unspecified chronicity, unspecified site     8. Musculoskeletal pain             Plan:      77-year-old pleasant  male who is a  is presented to establish care. Subjective complaint of ear fullness with impaired hearing is secondary to have a cerumen impaction. Patient stated that over-the-counter peroxide drops did not help. He would be scheduled with ENT for manual extraction. Avoid Q-tips  History of A. fib status post radiofrequency ablation. He is on aspirin as per cardiology that he regularly follows up with  Hypertension. Continue nonpharmacological.  Consume less than 2 g of salt a day  Increased BMI. He will benefit from low-fat high-fiber diet, daily moderate exercise, lifestyle change and weight reduction to keep BMI around 20  History of gout on allopurinol. Constipation. Continue bisacodyl.   Push more oral fluid, more fruits and vegetables  Patient recently had colonoscopy and polypectomy showing hyperplastic polyp. History of GERD. Continue proton pump inhibitor as needed  He denies tobacco, excessive alcohol or illicit drug use  Patient states that his mother had history of breast cancer and ovarian cancer. He had free BRCA gene that was positive. He is up-to-date on COVID vaccination however wished to hold off with influenza vaccination today  Med list and available labs reviewed, discussed with patient, questions answered  He is encouraged to call for any concern  This note is created with a voice recognition program and while intend to generate a document that accurately reflects the content of the visit, no guarantee can be provided that every mistake has been identified and corrected by editing.           Ivette Dang MD

## 2022-05-25 ENCOUNTER — PATIENT MESSAGE (OUTPATIENT)
Dept: INTERNAL MEDICINE CLINIC | Age: 52
End: 2022-05-25

## 2022-05-25 NOTE — TELEPHONE ENCOUNTER
From: Susan Eubanks  To: Dr. Elisabeth Spivey: 5/25/2022 11:54 AM EDT  Subject: Follow up from ENT    Dr. Blessing Egan,    I just wanted to check in and see how long to expect to hear from ENT on referral ? Reception thought it would be yesterday , but as of noon today I still have not heard from them.      Thank you in advance for any help ,      Kassy Edgar

## 2022-08-22 ENCOUNTER — OFFICE VISIT (OUTPATIENT)
Dept: INTERNAL MEDICINE CLINIC | Age: 52
End: 2022-08-22
Payer: COMMERCIAL

## 2022-08-22 VITALS
OXYGEN SATURATION: 98 % | HEART RATE: 78 BPM | RESPIRATION RATE: 16 BRPM | HEIGHT: 75 IN | SYSTOLIC BLOOD PRESSURE: 126 MMHG | DIASTOLIC BLOOD PRESSURE: 86 MMHG | TEMPERATURE: 98 F | BODY MASS INDEX: 34.32 KG/M2 | WEIGHT: 276 LBS

## 2022-08-22 DIAGNOSIS — Z15.03 BRCA GENE MUTATION POSITIVE IN MALE: Chronic | ICD-10-CM

## 2022-08-22 DIAGNOSIS — Z15.01 BRCA GENE MUTATION POSITIVE IN MALE: Chronic | ICD-10-CM

## 2022-08-22 DIAGNOSIS — R42 VERTIGO: Primary | ICD-10-CM

## 2022-08-22 DIAGNOSIS — Z98.890 H/O CARDIAC RADIOFREQUENCY ABLATION: ICD-10-CM

## 2022-08-22 DIAGNOSIS — M1A.09X0 IDIOPATHIC CHRONIC GOUT OF MULTIPLE SITES WITHOUT TOPHUS: Chronic | ICD-10-CM

## 2022-08-22 DIAGNOSIS — R63.8 INCREASED BMI: ICD-10-CM

## 2022-08-22 DIAGNOSIS — Z15.09 BRCA GENE MUTATION POSITIVE IN MALE: Chronic | ICD-10-CM

## 2022-08-22 DIAGNOSIS — K21.9 GERD WITHOUT ESOPHAGITIS: ICD-10-CM

## 2022-08-22 DIAGNOSIS — I48.0 PAROXYSMAL ATRIAL FIBRILLATION (HCC): Chronic | ICD-10-CM

## 2022-08-22 DIAGNOSIS — R29.818 SUSPECTED SLEEP APNEA: ICD-10-CM

## 2022-08-22 DIAGNOSIS — M10.00 IDIOPATHIC GOUT, UNSPECIFIED CHRONICITY, UNSPECIFIED SITE: ICD-10-CM

## 2022-08-22 PROCEDURE — 99214 OFFICE O/P EST MOD 30 MIN: CPT | Performed by: FAMILY MEDICINE

## 2022-08-22 RX ORDER — MECLIZINE HCL 12.5 MG/1
12.5 TABLET ORAL 3 TIMES DAILY PRN
Qty: 30 TABLET | Refills: 1 | Status: SHIPPED | OUTPATIENT
Start: 2022-08-22 | End: 2022-09-01

## 2022-08-22 RX ORDER — ALLOPURINOL 300 MG/1
300 TABLET ORAL DAILY
Qty: 90 TABLET | Refills: 1 | Status: SHIPPED | OUTPATIENT
Start: 2022-08-22

## 2022-08-22 RX ORDER — NAPROXEN 500 MG/1
500 TABLET ORAL 2 TIMES DAILY WITH MEALS
COMMUNITY

## 2022-08-22 ASSESSMENT — PATIENT HEALTH QUESTIONNAIRE - PHQ9
SUM OF ALL RESPONSES TO PHQ QUESTIONS 1-9: 0
2. FEELING DOWN, DEPRESSED OR HOPELESS: 0
SUM OF ALL RESPONSES TO PHQ9 QUESTIONS 1 & 2: 0
SUM OF ALL RESPONSES TO PHQ QUESTIONS 1-9: 0
1. LITTLE INTEREST OR PLEASURE IN DOING THINGS: 0

## 2022-08-22 ASSESSMENT — ENCOUNTER SYMPTOMS
EYES NEGATIVE: 1
ALLERGIC/IMMUNOLOGIC NEGATIVE: 1
GASTROINTESTINAL NEGATIVE: 1
RESPIRATORY NEGATIVE: 1

## 2022-08-22 NOTE — PROGRESS NOTES
Subjective:      Patient ID: Curtis Andersen is a 46 y.o. male. Dizziness  This is a new problem. The current episode started 1 to 4 weeks ago. The problem occurs rarely. The problem has been waxing and waning. Associated symptoms include vertigo. Exacerbated by: Sudden change in position of the head. He has tried rest, sleep, walking and relaxation for the symptoms. The treatment provided mild relief. Review of Systems   Constitutional: Negative. HENT: Negative. Eyes: Negative. Respiratory: Negative. Cardiovascular: Negative. Gastrointestinal: Negative. Endocrine: Negative. Musculoskeletal: Negative. Skin: Negative. Allergic/Immunologic: Negative. Neurological:  Positive for dizziness and vertigo. Hematological: Negative. Psychiatric/Behavioral: Negative. Past family and social history unremarkable. Diagnosis Orders   1. Vertigo        2. H/O cardiac radiofrequency ablation  CBC    Comprehensive Metabolic Panel    Hemoglobin A1C    Lipid Panel    Urinalysis with Microscopic    TSH    PSA Screening      3. GERD without esophagitis  CBC    Comprehensive Metabolic Panel    Hemoglobin A1C    Lipid Panel    Urinalysis with Microscopic    TSH    PSA Screening      4. Idiopathic gout, unspecified chronicity, unspecified site  CBC    Comprehensive Metabolic Panel    Hemoglobin A1C    Lipid Panel    Urinalysis with Microscopic    TSH    PSA Screening      5. Idiopathic chronic gout of multiple sites without tophus        6. Paroxysmal atrial fibrillation (HCC)  CBC    Comprehensive Metabolic Panel    Hemoglobin A1C    Lipid Panel    Urinalysis with Microscopic    TSH    PSA Screening      7. BRCA gene mutation positive in male        6. Suspected sleep apnea        9. Increased BMI            Objective:   Physical Exam  Vitals and nursing note reviewed. Constitutional:       Appearance: He is well-developed.       Comments: Obesity with suspected sleep apnea   HENT: Head: Normocephalic and atraumatic. Comments: Benign positional vertigo     Right Ear: External ear normal.      Left Ear: External ear normal.      Nose: Nose normal.   Eyes:      Conjunctiva/sclera: Conjunctivae normal.      Pupils: Pupils are equal, round, and reactive to light. Cardiovascular:      Rate and Rhythm: Normal rate and regular rhythm. Heart sounds: Normal heart sounds. Comments: History of atrial fibrillation status post radiofrequency ablation  Pulmonary:      Effort: Pulmonary effort is normal.      Breath sounds: Normal breath sounds. Abdominal:      General: Bowel sounds are normal.      Palpations: Abdomen is soft. Musculoskeletal:         General: Normal range of motion. Cervical back: Normal range of motion and neck supple. Comments: History of gout   Skin:     General: Skin is warm and dry. Neurological:      Mental Status: He is alert and oriented to person, place, and time. Deep Tendon Reflexes: Reflexes are normal and symmetric. Psychiatric:         Behavior: Behavior normal.         Thought Content: Thought content normal.       Assessment:       Diagnosis Orders   1. Vertigo        2. H/O cardiac radiofrequency ablation  CBC    Comprehensive Metabolic Panel    Hemoglobin A1C    Lipid Panel    Urinalysis with Microscopic    TSH    PSA Screening      3. GERD without esophagitis  CBC    Comprehensive Metabolic Panel    Hemoglobin A1C    Lipid Panel    Urinalysis with Microscopic    TSH    PSA Screening      4. Idiopathic gout, unspecified chronicity, unspecified site  CBC    Comprehensive Metabolic Panel    Hemoglobin A1C    Lipid Panel    Urinalysis with Microscopic    TSH    PSA Screening      5. Idiopathic chronic gout of multiple sites without tophus        6. Paroxysmal atrial fibrillation (HCC)  CBC    Comprehensive Metabolic Panel    Hemoglobin A1C    Lipid Panel    Urinalysis with Microscopic    TSH    PSA Screening      7.  BRCA gene mutation positive in male        8. Suspected sleep apnea        9. Increased BMI                Plan:      51-year-old male is presented with signs and symptom and history suggestive of benign positional vertigo. Patient states that he experiences exacerbation and started looking up or turning his head side to side. He denies any ringing in the ear or neck pain. He also denies chest pain palpitation, diaphoresis or loss of bladder and bowel dysfunction. I offered him physical therapy versus ENT consultation that she wished to hold off. He wished to try Antivert. Maintain oral hydration, avoid sudden turning of his head  History of gout. Continue allopurinol. Obesity with suspected sleep apnea. He would benefit from low-fat high-fiber diet, daily moderate exercise, lifestyle change and weight reduction to keep BMI around 25. He is also advised to consider sleep study  Depression screen is negative  He denies tobacco, excessive alcohol or illicit drug use  History of GERD resolved to radiofrequency ablation. He is normotensive ventricular response rate in 70s. He is established however he has not seen his cardiologist in a long time. Compliance is advised  History of GERD on proton pump inhibitor. Advised to take it as needed  History of constipation. Maintain oral hydration, more fruits and vegetables and over-the-counter fibers. Increase mobility as tolerated  Further recommendations to follow updated labs  He is up influenza, pneumococcal vaccine, Tdap and colonoscopy  Call for any concern  Follow-up in 6 months  This note is created with a voice recognition program and while intend to generate a document that accurately reflects the content of the visit, no guarantee can be provided that every mistake has been identified and corrected by editing.             Filemon Culver MD

## 2022-08-23 ENCOUNTER — HOSPITAL ENCOUNTER (OUTPATIENT)
Age: 52
Setting detail: SPECIMEN
Discharge: HOME | End: 2022-08-23

## 2022-08-23 DIAGNOSIS — M10.00 IDIOPATHIC GOUT, UNSPECIFIED CHRONICITY, UNSPECIFIED SITE: ICD-10-CM

## 2022-08-23 DIAGNOSIS — Z98.890 H/O CARDIAC RADIOFREQUENCY ABLATION: ICD-10-CM

## 2022-08-23 DIAGNOSIS — I48.0 PAROXYSMAL ATRIAL FIBRILLATION (HCC): Chronic | ICD-10-CM

## 2022-08-23 DIAGNOSIS — K21.9 GERD WITHOUT ESOPHAGITIS: ICD-10-CM

## 2022-08-23 LAB
ALBUMIN SERPL-MCNC: 4.6 G/DL (ref 3.5–5.2)
ALBUMIN/GLOBULIN RATIO: 1.9 (ref 1–2.5)
ALP BLD-CCNC: 71 U/L (ref 40–129)
ALT SERPL-CCNC: 33 U/L (ref 5–41)
ANION GAP SERPL CALCULATED.3IONS-SCNC: 11 MMOL/L (ref 9–17)
AST SERPL-CCNC: 29 U/L
BILIRUB SERPL-MCNC: 2.21 MG/DL (ref 0.3–1.2)
BILIRUBIN URINE: NEGATIVE
BUN BLDV-MCNC: 26 MG/DL (ref 6–20)
CALCIUM SERPL-MCNC: 9.2 MG/DL (ref 8.6–10.4)
CHLORIDE BLD-SCNC: 104 MMOL/L (ref 98–107)
CHOLESTEROL/HDL RATIO: 5
CHOLESTEROL: 140 MG/DL
CO2: 25 MMOL/L (ref 20–31)
COLOR: YELLOW
CREAT SERPL-MCNC: 0.99 MG/DL (ref 0.7–1.2)
EPITHELIAL CELLS UA: ABNORMAL /HPF (ref 0–5)
ESTIMATED AVERAGE GLUCOSE: 88 MG/DL
GFR AFRICAN AMERICAN: >60 ML/MIN
GFR NON-AFRICAN AMERICAN: >60 ML/MIN
GFR SERPL CREATININE-BSD FRML MDRD: ABNORMAL ML/MIN/{1.73_M2}
GLUCOSE BLD-MCNC: 75 MG/DL (ref 70–99)
GLUCOSE URINE: NEGATIVE
HBA1C MFR BLD: 4.7 % (ref 4–6)
HCT VFR BLD CALC: 45 % (ref 40.7–50.3)
HDLC SERPL-MCNC: 28 MG/DL
HEMOGLOBIN: 15.7 G/DL (ref 13–17)
KETONES, URINE: ABNORMAL
LDL CHOLESTEROL: 97 MG/DL (ref 0–130)
LEUKOCYTE ESTERASE, URINE: NEGATIVE
MCH RBC QN AUTO: 28.8 PG (ref 25.2–33.5)
MCHC RBC AUTO-ENTMCNC: 34.9 G/DL (ref 28.4–34.8)
MCV RBC AUTO: 82.4 FL (ref 82.6–102.9)
NITRITE, URINE: NEGATIVE
NRBC AUTOMATED: 0 PER 100 WBC
PDW BLD-RTO: 13.2 % (ref 11.8–14.4)
PH UA: 6.5 (ref 5–8)
PLATELET # BLD: 146 K/UL (ref 138–453)
PMV BLD AUTO: 10.7 FL (ref 8.1–13.5)
POTASSIUM SERPL-SCNC: 4.3 MMOL/L (ref 3.7–5.3)
PROSTATE SPECIFIC ANTIGEN: 1.21 NG/ML
PROTEIN UA: NEGATIVE
RBC # BLD: 5.46 M/UL (ref 4.21–5.77)
RBC UA: ABNORMAL /HPF (ref 0–4)
SODIUM BLD-SCNC: 140 MMOL/L (ref 135–144)
SPECIFIC GRAVITY UA: 1.02 (ref 1–1.03)
TOTAL PROTEIN: 7 G/DL (ref 6.4–8.3)
TRIGL SERPL-MCNC: 73 MG/DL
TSH SERPL DL<=0.05 MIU/L-ACNC: 0.74 UIU/ML (ref 0.3–5)
TURBIDITY: CLEAR
URINE HGB: NEGATIVE
UROBILINOGEN, URINE: NORMAL
WBC # BLD: 4.2 K/UL (ref 3.5–11.3)
WBC UA: ABNORMAL /HPF (ref 0–5)

## 2023-02-06 ENCOUNTER — OFFICE VISIT (OUTPATIENT)
Dept: INTERNAL MEDICINE CLINIC | Age: 53
End: 2023-02-06
Payer: COMMERCIAL

## 2023-02-06 VITALS
HEIGHT: 75 IN | BODY MASS INDEX: 34.32 KG/M2 | HEART RATE: 73 BPM | RESPIRATION RATE: 20 BRPM | WEIGHT: 276 LBS | OXYGEN SATURATION: 97 % | DIASTOLIC BLOOD PRESSURE: 82 MMHG | TEMPERATURE: 97.5 F | SYSTOLIC BLOOD PRESSURE: 128 MMHG

## 2023-02-06 DIAGNOSIS — M1A.09X0 IDIOPATHIC CHRONIC GOUT OF MULTIPLE SITES WITHOUT TOPHUS: Chronic | ICD-10-CM

## 2023-02-06 DIAGNOSIS — M70.52 PES ANSERINUS BURSITIS OF LEFT KNEE: ICD-10-CM

## 2023-02-06 DIAGNOSIS — R42 VERTIGO: ICD-10-CM

## 2023-02-06 DIAGNOSIS — M10.00 IDIOPATHIC GOUT, UNSPECIFIED CHRONICITY, UNSPECIFIED SITE: ICD-10-CM

## 2023-02-06 DIAGNOSIS — I48.0 PAROXYSMAL ATRIAL FIBRILLATION (HCC): Chronic | ICD-10-CM

## 2023-02-06 DIAGNOSIS — R29.818 SUSPECTED SLEEP APNEA: ICD-10-CM

## 2023-02-06 DIAGNOSIS — R63.8 INCREASED BMI: ICD-10-CM

## 2023-02-06 DIAGNOSIS — K21.9 GERD WITHOUT ESOPHAGITIS: ICD-10-CM

## 2023-02-06 DIAGNOSIS — Z98.890 H/O CARDIAC RADIOFREQUENCY ABLATION: Primary | ICD-10-CM

## 2023-02-06 PROCEDURE — 99214 OFFICE O/P EST MOD 30 MIN: CPT | Performed by: FAMILY MEDICINE

## 2023-02-06 RX ORDER — ALLOPURINOL 300 MG/1
300 TABLET ORAL DAILY
Qty: 90 TABLET | Refills: 1 | Status: SHIPPED | OUTPATIENT
Start: 2023-02-06

## 2023-02-06 SDOH — ECONOMIC STABILITY: INCOME INSECURITY: HOW HARD IS IT FOR YOU TO PAY FOR THE VERY BASICS LIKE FOOD, HOUSING, MEDICAL CARE, AND HEATING?: NOT HARD AT ALL

## 2023-02-06 SDOH — ECONOMIC STABILITY: FOOD INSECURITY: WITHIN THE PAST 12 MONTHS, YOU WORRIED THAT YOUR FOOD WOULD RUN OUT BEFORE YOU GOT MONEY TO BUY MORE.: NEVER TRUE

## 2023-02-06 SDOH — ECONOMIC STABILITY: HOUSING INSECURITY
IN THE LAST 12 MONTHS, WAS THERE A TIME WHEN YOU DID NOT HAVE A STEADY PLACE TO SLEEP OR SLEPT IN A SHELTER (INCLUDING NOW)?: NO

## 2023-02-06 SDOH — ECONOMIC STABILITY: FOOD INSECURITY: WITHIN THE PAST 12 MONTHS, THE FOOD YOU BOUGHT JUST DIDN'T LAST AND YOU DIDN'T HAVE MONEY TO GET MORE.: NEVER TRUE

## 2023-02-06 ASSESSMENT — ENCOUNTER SYMPTOMS
ALLERGIC/IMMUNOLOGIC NEGATIVE: 1
EYES NEGATIVE: 1
RESPIRATORY NEGATIVE: 1
GASTROINTESTINAL NEGATIVE: 1

## 2023-02-06 ASSESSMENT — PATIENT HEALTH QUESTIONNAIRE - PHQ9
SUM OF ALL RESPONSES TO PHQ QUESTIONS 1-9: 0
SUM OF ALL RESPONSES TO PHQ QUESTIONS 1-9: 0
1. LITTLE INTEREST OR PLEASURE IN DOING THINGS: 0
2. FEELING DOWN, DEPRESSED OR HOPELESS: 0
SUM OF ALL RESPONSES TO PHQ QUESTIONS 1-9: 0
SUM OF ALL RESPONSES TO PHQ QUESTIONS 1-9: 0
SUM OF ALL RESPONSES TO PHQ9 QUESTIONS 1 & 2: 0

## 2023-02-06 NOTE — PROGRESS NOTES
Subjective:      Patient ID: Bobby Gotti is a 46 y.o. male. Knee Pain   The incident occurred more than 1 week ago. The injury mechanism is unknown. Pain location: Left knee. The pain is at a severity of 4/10. The pain is moderate. The symptoms are aggravated by palpation. He has tried nothing for the symptoms. The treatment provided mild relief. Review of Systems   Constitutional: Negative. HENT: Negative. Eyes: Negative. Respiratory: Negative. Cardiovascular: Negative. Gastrointestinal: Negative. Endocrine: Negative. Musculoskeletal:  Positive for arthralgias. Skin: Negative. Allergic/Immunologic: Negative. Neurological: Negative. Hematological: Negative. Psychiatric/Behavioral: Negative. Past family and social history unremarkable. Diagnosis Orders   1. H/O cardiac radiofrequency ablation        2. GERD without esophagitis        3. Idiopathic gout, unspecified chronicity, unspecified site        4. Suspected sleep apnea        5. Increased BMI        6. Vertigo        7. Idiopathic chronic gout of multiple sites without tophus        8. Paroxysmal atrial fibrillation (HCC)        9. Pes anserinus bursitis of left knee            Objective:   Physical Exam  Vitals and nursing note reviewed. Constitutional:       Appearance: He is well-developed. Comments: Obesity with clinical sleep apnea   HENT:      Head: Normocephalic and atraumatic. Right Ear: External ear normal.      Left Ear: External ear normal.      Nose: Nose normal.   Eyes:      Conjunctiva/sclera: Conjunctivae normal.      Pupils: Pupils are equal, round, and reactive to light. Cardiovascular:      Rate and Rhythm: Normal rate and regular rhythm. Heart sounds: Normal heart sounds. Comments: History of fib fib status post radiofrequency ablation  PVCs  Pulmonary:      Effort: Pulmonary effort is normal.      Breath sounds: Normal breath sounds.    Abdominal:      General: Bowel sounds are normal.      Palpations: Abdomen is soft. Comments: GERD   Musculoskeletal:         General: Normal range of motion. Cervical back: Normal range of motion and neck supple. Comments: Pes anserinus tendinitis/bursitis left knee with palpable reproducible tenderness   Skin:     General: Skin is warm and dry. Neurological:      Mental Status: He is alert and oriented to person, place, and time. Deep Tendon Reflexes: Reflexes are normal and symmetric. Psychiatric:         Behavior: Behavior normal.         Thought Content: Thought content normal.       Assessment:       Diagnosis Orders   1. H/O cardiac radiofrequency ablation        2. GERD without esophagitis        3. Idiopathic gout, unspecified chronicity, unspecified site        4. Suspected sleep apnea        5. Increased BMI        6. Vertigo        7. Idiopathic chronic gout of multiple sites without tophus        8. Paroxysmal atrial fibrillation (HCC)        9. Pes anserinus bursitis of left knee                Plan:      59-year-old overweight  male is presented with signs symptoms history and clinical evaluation consistent with pes anserinus bursitis/tendinitis left knee. He is counseled. Reassurance provided. Anticipate gradual improvement. May use over-the-counter massage cream/anti-inflammatories, stretching as demonstrated,  He may consider physical therapy. If it continues to worsen and interferes with ADL, we will consider cortisone injections  Increased BMI with sleep apnea. He would benefit from low-fat high-fiber diet, daily moderate exercise, lifestyle change. He is advised to consider sleep study. He states that he will check with his insurance  History of gout. Continue allopurinol. Lose weight, dietary discretion  History of chronic A-fib. He was recently seen by his cardiologist.  History of radiofrequency ablation. He is on aspirin.   He recently had an event monitor that showed occasional PVCs.  He is advised to lose weight and address sleep apnea  He says he recently cut down on excessive caffeine  History of vertigo. Resolved  He denies tobacco, excessive alcohol or illicit drug use  Med list and available labs reviewed, discussed with patient, questions answered  He is encouraged to call for any concerns  This note is created with a voice recognition program and while intend to generate a document that accurately reflects the content of the visit, no guarantee can be provided that every mistake has been identified and corrected by editing.           Jake Leigh MD

## 2023-02-06 NOTE — PROGRESS NOTES
Subjective:    A user error has taken place: encounter opened in error, closed for administrative reasons. Patient ID: Fransisca Valdez is a 46 y.o. male.     HPI    Review of Systems    Objective:   Physical Exam    Assessment:            Plan:              Martina White MD

## 2023-05-16 NOTE — TELEPHONE ENCOUNTER
Deidra Shafer is calling to request a refill on the following medication(s):    Medication Request:  Requested Prescriptions     Pending Prescriptions Disp Refills    allopurinol (ZYLOPRIM) 300 MG tablet 90 tablet 0       Last Visit Date (If Applicable):  2/82/2219    Next Visit Date:    Visit date not found
161
English

## (undated) DEVICE — CO2 CANNULA,SUPERSOFT, ADLT,7'O2,7'CO2: Brand: MEDLINE

## (undated) DEVICE — SINGLE-USE POLYPECTOMY SNARE: Brand: CAPTIFLEX

## (undated) DEVICE — ADAPTER TBNG LUER STUB 15 GA INTMED

## (undated) DEVICE — TUBING, SUCTION, 1/4" X 12', STRAIGHT: Brand: MEDLINE

## (undated) DEVICE — Device: Brand: DEFENDO VALVE AND CONNECTOR KIT

## (undated) DEVICE — SYRINGE MED 50ML LUERLOCK TIP

## (undated) DEVICE — GLOVE SURG 8 11.7IN BEAD CUF LIGHT BRN SENSICARE LTX FREE

## (undated) DEVICE — GOWN,AURORA,NONREINFORCED,LARGE: Brand: MEDLINE

## (undated) DEVICE — ELECTRODE PT RET AD L9FT HI MOIST COND ADH HYDRGEL CORDED

## (undated) DEVICE — BASIN EMSIS 700ML GRAPHITE PLAS KID SHP GRAD

## (undated) DEVICE — MEDICINE CUP, GRADUATED, STER: Brand: MEDLINE